# Patient Record
Sex: MALE | Race: WHITE | Employment: OTHER | URBAN - METROPOLITAN AREA
[De-identification: names, ages, dates, MRNs, and addresses within clinical notes are randomized per-mention and may not be internally consistent; named-entity substitution may affect disease eponyms.]

---

## 2018-02-13 LAB
AVERAGE GLUCOSE: NORMAL
HBA1C MFR BLD: 5.9 %

## 2019-03-01 LAB — DIABETIC RETINOPATHY: NEGATIVE

## 2019-07-18 RX ORDER — ATORVASTATIN CALCIUM 40 MG/1
40 TABLET, FILM COATED ORAL DAILY
Qty: 90 TABLET | Refills: 1 | Status: SHIPPED | OUTPATIENT
Start: 2019-07-18 | End: 2019-12-03 | Stop reason: SDUPTHER

## 2019-07-30 LAB
ALBUMIN SERPL-MCNC: NORMAL G/DL
ALP BLD-CCNC: NORMAL U/L
ALT SERPL-CCNC: NORMAL U/L
ANION GAP SERPL CALCULATED.3IONS-SCNC: NORMAL MMOL/L
AST SERPL-CCNC: NORMAL U/L
BASOPHILS ABSOLUTE: NORMAL /ΜL
BASOPHILS RELATIVE PERCENT: NORMAL %
BILIRUB SERPL-MCNC: NORMAL MG/DL (ref 0.1–1.4)
BUN BLDV-MCNC: NORMAL MG/DL
CALCIUM SERPL-MCNC: NORMAL MG/DL
CHLORIDE BLD-SCNC: NORMAL MMOL/L
CHOLESTEROL, TOTAL: 99 MG/DL
CHOLESTEROL/HDL RATIO: ABNORMAL
CO2: NORMAL MMOL/L
CREAT SERPL-MCNC: NORMAL MG/DL
CREATININE, URINE: 80
EOSINOPHILS ABSOLUTE: NORMAL /ΜL
EOSINOPHILS RELATIVE PERCENT: NORMAL %
GFR CALCULATED: NORMAL
GLUCOSE BLD-MCNC: NORMAL MG/DL
HCT VFR BLD CALC: NORMAL % (ref 41–53)
HDLC SERPL-MCNC: 23 MG/DL (ref 35–70)
HEMOGLOBIN: NORMAL G/DL (ref 13.5–17.5)
LDL CHOLESTEROL CALCULATED: 49 MG/DL (ref 0–160)
LYMPHOCYTES ABSOLUTE: NORMAL /ΜL
LYMPHOCYTES RELATIVE PERCENT: NORMAL %
MCH RBC QN AUTO: NORMAL PG
MCHC RBC AUTO-ENTMCNC: NORMAL G/DL
MCV RBC AUTO: NORMAL FL
MICROALBUMIN/CREAT 24H UR: 0.5 MG/G{CREAT}
MICROALBUMIN/CREAT UR-RTO: 6
MONOCYTES ABSOLUTE: NORMAL /ΜL
MONOCYTES RELATIVE PERCENT: NORMAL %
NEUTROPHILS ABSOLUTE: NORMAL /ΜL
NEUTROPHILS RELATIVE PERCENT: NORMAL %
PLATELET # BLD: NORMAL K/ΜL
PMV BLD AUTO: NORMAL FL
POTASSIUM SERPL-SCNC: NORMAL MMOL/L
PROSTATE SPECIFIC ANTIGEN: NORMAL NG/ML
RBC # BLD: NORMAL 10^6/ΜL
SODIUM BLD-SCNC: NORMAL MMOL/L
TOTAL PROTEIN: NORMAL
TRIGL SERPL-MCNC: 194 MG/DL
TSH SERPL DL<=0.05 MIU/L-ACNC: 1.59 UIU/ML
VLDLC SERPL CALC-MCNC: ABNORMAL MG/DL
WBC # BLD: NORMAL 10^3/ML

## 2019-08-26 ENCOUNTER — OFFICE VISIT (OUTPATIENT)
Dept: PRIMARY CARE CLINIC | Age: 69
End: 2019-08-26
Payer: MEDICARE

## 2019-08-26 VITALS
DIASTOLIC BLOOD PRESSURE: 70 MMHG | WEIGHT: 232.6 LBS | OXYGEN SATURATION: 98 % | HEART RATE: 88 BPM | SYSTOLIC BLOOD PRESSURE: 134 MMHG | BODY MASS INDEX: 34.35 KG/M2

## 2019-08-26 DIAGNOSIS — E55.9 VITAMIN D DEFICIENCY, UNSPECIFIED: ICD-10-CM

## 2019-08-26 DIAGNOSIS — E78.2 MIXED HYPERLIPIDEMIA: ICD-10-CM

## 2019-08-26 DIAGNOSIS — D64.9 ANEMIA, UNSPECIFIED TYPE: ICD-10-CM

## 2019-08-26 DIAGNOSIS — H10.9 CONJUNCTIVITIS OF LEFT EYE, UNSPECIFIED CONJUNCTIVITIS TYPE: ICD-10-CM

## 2019-08-26 DIAGNOSIS — D07.5 CARCINOMA IN SITU OF PROSTATE: ICD-10-CM

## 2019-08-26 DIAGNOSIS — E53.9 VITAMIN B DEFICIENCY: ICD-10-CM

## 2019-08-26 DIAGNOSIS — E11.8 TYPE 2 DIABETES MELLITUS WITH COMPLICATION, UNSPECIFIED WHETHER LONG TERM INSULIN USE: Primary | ICD-10-CM

## 2019-08-26 DIAGNOSIS — I10 HYPERTENSION, ESSENTIAL: ICD-10-CM

## 2019-08-26 DIAGNOSIS — J30.1 SEASONAL ALLERGIC RHINITIS DUE TO POLLEN: ICD-10-CM

## 2019-08-26 PROCEDURE — 99214 OFFICE O/P EST MOD 30 MIN: CPT | Performed by: FAMILY MEDICINE

## 2019-08-26 RX ORDER — TOBRAMYCIN 3 MG/ML
SOLUTION/ DROPS OPHTHALMIC
Qty: 5 ML | Refills: 0 | Status: SHIPPED | OUTPATIENT
Start: 2019-08-26 | End: 2019-12-03

## 2019-08-26 NOTE — ASSESSMENT & PLAN NOTE
Recommend nasal saline, nasal steroid with precautions plus/minus Allegra daily as needed or equivalent

## 2019-08-26 NOTE — PROGRESS NOTES
19  Mandi Allen : 1950 Sex: male  Age: 71 y.o. Chief Complaint   Patient presents with    Discuss Labs       HPI  HPI:    Overall feels well. For 2 days he has had redness of the left eye with drainage and matting but no pain or change in vision. History of ophthalmic issues and follows with ophthalmologist but this is different. Precautions reviewed. Encouraged the ophthalmologist to be safe with his history. Chronic watery postnasal drainage. No other points concerns. Follows with Dr. Madonna Hill. Blood work reviewed. Microalbumin negative. Triglyceride 194 HDL 23 LDL 49 glucose 149 CMP normal  TSH 1.59 CBC normal PSA less than 0.1      Review of Systems  ROS:  Const: Denies chills, fever, malaise and sweats. Eyes: Chronic ophthalmic symptoms with chronic visual disturbance, acutely has nonpainful redness drainage and matting. ENMT: Denies earaches, other ear symptoms. Denies nasal or sinus symptoms other than stated  above. Denies mouth and tongue lesions and sore throat. CV: Denies chest discomfort, pain; diaphoresis, dizziness, edema, lightheadedness, orthopnea,  palpitations, syncope and near syncopal episode or any exertional symptoms  Resp: Denies cough, hemoptysis, pleuritic pain, SOB, sputum production and wheezing. GI: Denies abdominal pain, change in bowel habits, hematochezia, melena, nausea and vomiting. : Denies urinary symptoms including dysuria , urgency, frequency or hematuria. Musculo: Denies musculoskeletal symptoms. Skin: Denies bruising and rash.   Neuro: Denies headache, numbness, stiff neck, tingling and focal weakness slurred speech or facial  droop  Hema/Lymph: Denies bleeding/bruising tendency and enlarged lymph nodes        Current Outpatient Medications:     tobramycin (TOBREX) 0.3 % ophthalmic solution, 2 gtts B/L eyes every 3-4 hours while awake day 1, then tid x 7 days total., Disp: 5 mL, Rfl: 0    atorvastatin (LIPITOR) 40 MG tablet, Take 1

## 2019-08-26 NOTE — ASSESSMENT & PLAN NOTE
Continue per clayton. Chasity . PSA <0.01 on 8/19. I recommended he  follow-up with urology still with history of prostate cancer, history of seeds.  No longer  seeing Dr. Rufina Palacio

## 2019-11-19 RX ORDER — METOPROLOL SUCCINATE 25 MG/1
25 TABLET, EXTENDED RELEASE ORAL DAILY
Qty: 30 TABLET | Refills: 0 | Status: SHIPPED | OUTPATIENT
Start: 2019-11-19 | End: 2019-12-03 | Stop reason: SDUPTHER

## 2019-11-23 LAB
ALBUMIN SERPL-MCNC: 4.6 G/DL
ALP BLD-CCNC: 45 U/L
ALT SERPL-CCNC: 27 U/L
ANION GAP SERPL CALCULATED.3IONS-SCNC: NORMAL MMOL/L
AST SERPL-CCNC: 17 U/L
BASOPHILS ABSOLUTE: 49 /ΜL
BASOPHILS RELATIVE PERCENT: 0.8 %
BILIRUB SERPL-MCNC: 0.6 MG/DL (ref 0.1–1.4)
BILIRUBIN, URINE: NEGATIVE
BLOOD, URINE: NEGATIVE
BUN BLDV-MCNC: 22 MG/DL
CALCIUM SERPL-MCNC: 10.5 MG/DL
CHLORIDE BLD-SCNC: 104 MMOL/L
CHOLESTEROL, TOTAL: 119 MG/DL
CHOLESTEROL/HDL RATIO: 5.7
CLARITY: CLEAR
CO2: 29 MMOL/L
COLOR: YELLOW
CREAT SERPL-MCNC: 0.95 MG/DL
CREATININE, URINE: NORMAL
EOSINOPHILS ABSOLUTE: 128 /ΜL
EOSINOPHILS RELATIVE PERCENT: 2.1 %
GFR CALCULATED: NORMAL
GLUCOSE BLD-MCNC: 112 MG/DL
GLUCOSE URINE: NORMAL
HCT VFR BLD CALC: 44 % (ref 41–53)
HDLC SERPL-MCNC: 21 MG/DL (ref 35–70)
HEMOGLOBIN: 14.6 G/DL (ref 13.5–17.5)
KETONES, URINE: NEGATIVE
LDL CHOLESTEROL CALCULATED: 71 MG/DL (ref 0–160)
LEUKOCYTE ESTERASE, URINE: NEGATIVE
LYMPHOCYTES ABSOLUTE: 1983 /ΜL
LYMPHOCYTES RELATIVE PERCENT: 32.5 %
MCH RBC QN AUTO: 29.6 PG
MCHC RBC AUTO-ENTMCNC: 33.2 G/DL
MCV RBC AUTO: 89.2 FL
MICROALBUMIN/CREAT 24H UR: NORMAL MG/G{CREAT}
MICROALBUMIN/CREAT UR-RTO: 6
MONOCYTES ABSOLUTE: 470 /ΜL
MONOCYTES RELATIVE PERCENT: 7.7 %
NEUTROPHILS ABSOLUTE: 3471 /ΜL
NEUTROPHILS RELATIVE PERCENT: 56.9 %
NITRITE, URINE: NEGATIVE
PDW BLD-RTO: 13.4 %
PH UA: NORMAL
PLATELET # BLD: 260 K/ΜL
PMV BLD AUTO: 12.6 FL
POTASSIUM SERPL-SCNC: 4.5 MMOL/L
PROTEIN UA: NEGATIVE
RBC # BLD: 4.93 10^6/ΜL
SODIUM BLD-SCNC: 140 MMOL/L
SPECIFIC GRAVITY, URINE: 1.02
TOTAL CK: 167 U/L
TOTAL PROTEIN: 7.4
TRIGL SERPL-MCNC: 208 MG/DL
TSH SERPL DL<=0.05 MIU/L-ACNC: 2.17 UIU/ML
UROBILINOGEN, URINE: NORMAL
VLDLC SERPL CALC-MCNC: ABNORMAL MG/DL
WBC # BLD: 6.1 10^3/ML

## 2019-12-03 ENCOUNTER — OFFICE VISIT (OUTPATIENT)
Dept: PRIMARY CARE CLINIC | Age: 69
End: 2019-12-03
Payer: MEDICARE

## 2019-12-03 VITALS
OXYGEN SATURATION: 96 % | DIASTOLIC BLOOD PRESSURE: 60 MMHG | BODY MASS INDEX: 34.67 KG/M2 | SYSTOLIC BLOOD PRESSURE: 128 MMHG | WEIGHT: 234.8 LBS | HEART RATE: 83 BPM

## 2019-12-03 DIAGNOSIS — E11.8 TYPE 2 DIABETES MELLITUS WITH COMPLICATION (HCC): ICD-10-CM

## 2019-12-03 DIAGNOSIS — Z00.00 HEALTH MAINTENANCE EXAMINATION: ICD-10-CM

## 2019-12-03 DIAGNOSIS — E55.9 VITAMIN D DEFICIENCY, UNSPECIFIED: ICD-10-CM

## 2019-12-03 DIAGNOSIS — E53.9 VITAMIN B DEFICIENCY: ICD-10-CM

## 2019-12-03 DIAGNOSIS — I10 HYPERTENSION, ESSENTIAL: ICD-10-CM

## 2019-12-03 DIAGNOSIS — E78.2 MIXED HYPERLIPIDEMIA: Primary | ICD-10-CM

## 2019-12-03 DIAGNOSIS — D64.9 ANEMIA, UNSPECIFIED TYPE: ICD-10-CM

## 2019-12-03 DIAGNOSIS — D07.5 CARCINOMA IN SITU OF PROSTATE: ICD-10-CM

## 2019-12-03 DIAGNOSIS — E83.52 HYPERCALCEMIA: ICD-10-CM

## 2019-12-03 PROCEDURE — 99214 OFFICE O/P EST MOD 30 MIN: CPT | Performed by: FAMILY MEDICINE

## 2019-12-03 PROCEDURE — G0008 ADMIN INFLUENZA VIRUS VAC: HCPCS | Performed by: FAMILY MEDICINE

## 2019-12-03 PROCEDURE — 90653 IIV ADJUVANT VACCINE IM: CPT | Performed by: FAMILY MEDICINE

## 2019-12-03 RX ORDER — ATORVASTATIN CALCIUM 40 MG/1
40 TABLET, FILM COATED ORAL DAILY
Qty: 90 TABLET | Refills: 3 | Status: SHIPPED
Start: 2019-12-03 | End: 2020-10-19 | Stop reason: SDUPTHER

## 2019-12-03 RX ORDER — QUINAPRIL 20 MG/1
20 TABLET ORAL DAILY
Qty: 90 TABLET | Refills: 3 | Status: SHIPPED
Start: 2019-12-03 | End: 2020-10-19 | Stop reason: SDUPTHER

## 2019-12-03 RX ORDER — METOPROLOL SUCCINATE 25 MG/1
25 TABLET, EXTENDED RELEASE ORAL DAILY
Qty: 90 TABLET | Refills: 3 | Status: SHIPPED
Start: 2019-12-03 | End: 2020-10-19 | Stop reason: SDUPTHER

## 2019-12-18 DIAGNOSIS — D64.9 ANEMIA, UNSPECIFIED TYPE: ICD-10-CM

## 2019-12-18 DIAGNOSIS — E53.9 VITAMIN B DEFICIENCY: ICD-10-CM

## 2019-12-18 DIAGNOSIS — D07.5 CARCINOMA IN SITU OF PROSTATE: ICD-10-CM

## 2019-12-18 DIAGNOSIS — E55.9 VITAMIN D DEFICIENCY, UNSPECIFIED: ICD-10-CM

## 2019-12-18 DIAGNOSIS — H10.9 CONJUNCTIVITIS OF LEFT EYE, UNSPECIFIED CONJUNCTIVITIS TYPE: ICD-10-CM

## 2019-12-18 DIAGNOSIS — I10 HYPERTENSION, ESSENTIAL: ICD-10-CM

## 2019-12-18 DIAGNOSIS — E11.8 TYPE 2 DIABETES MELLITUS WITH COMPLICATION (HCC): ICD-10-CM

## 2019-12-18 DIAGNOSIS — E78.2 MIXED HYPERLIPIDEMIA: ICD-10-CM

## 2020-01-02 PROBLEM — Z00.00 HEALTH MAINTENANCE EXAMINATION: Status: RESOLVED | Noted: 2019-12-03 | Resolved: 2020-01-02

## 2020-03-05 ENCOUNTER — OFFICE VISIT (OUTPATIENT)
Dept: FAMILY MEDICINE CLINIC | Age: 70
End: 2020-03-05
Payer: MEDICARE

## 2020-03-05 VITALS
TEMPERATURE: 98 F | SYSTOLIC BLOOD PRESSURE: 126 MMHG | HEART RATE: 112 BPM | OXYGEN SATURATION: 98 % | DIASTOLIC BLOOD PRESSURE: 68 MMHG | BODY MASS INDEX: 34.2 KG/M2 | WEIGHT: 231.6 LBS

## 2020-03-05 PROBLEM — C61 PRIMARY MALIGNANT NEOPLASM OF PROSTATE (HCC): Status: ACTIVE | Noted: 2019-06-11

## 2020-03-05 PROBLEM — E11.49 DIABETIC NEUROPATHY WITH NEUROLOGIC COMPLICATION (HCC): Status: ACTIVE | Noted: 2019-06-11

## 2020-03-05 PROBLEM — E11.40 DIABETIC NEUROPATHY WITH NEUROLOGIC COMPLICATION (HCC): Status: ACTIVE | Noted: 2019-06-11

## 2020-03-05 PROBLEM — E66.9 OBESITY: Status: ACTIVE | Noted: 2019-06-11

## 2020-03-05 PROBLEM — Z79.4 LONG TERM CURRENT USE OF INSULIN (HCC): Status: ACTIVE | Noted: 2019-06-11

## 2020-03-05 PROCEDURE — 99213 OFFICE O/P EST LOW 20 MIN: CPT | Performed by: FAMILY MEDICINE

## 2020-03-05 RX ORDER — AMOXICILLIN 875 MG/1
875 TABLET, COATED ORAL 2 TIMES DAILY
Qty: 14 TABLET | Refills: 0 | Status: SHIPPED | OUTPATIENT
Start: 2020-03-05 | End: 2020-03-12

## 2020-03-05 RX ORDER — LIDOCAINE HYDROCHLORIDE 20 MG/ML
10 SOLUTION OROPHARYNGEAL
Qty: 100 ML | Refills: 1 | Status: SHIPPED
Start: 2020-03-05 | End: 2020-06-01 | Stop reason: ALTCHOICE

## 2020-03-05 RX ORDER — METHYLPREDNISOLONE 4 MG/1
TABLET ORAL
Qty: 1 KIT | Refills: 0 | Status: SHIPPED
Start: 2020-03-05 | End: 2020-06-01 | Stop reason: ALTCHOICE

## 2020-03-05 ASSESSMENT — ENCOUNTER SYMPTOMS
TROUBLE SWALLOWING: 1
SWOLLEN GLANDS: 1
SORE THROAT: 1
EYES NEGATIVE: 1
RESPIRATORY NEGATIVE: 1
GASTROINTESTINAL NEGATIVE: 1

## 2020-03-05 NOTE — PROGRESS NOTES
3/5/2020     Isidoro Means (:  1950) is a 71 y.o. male, here for evaluation of the following medical concerns:    Pharyngitis   This is a new problem. The current episode started yesterday. The problem has been rapidly worsening. Associated symptoms include a fever, a sore throat and swollen glands. Pertinent negatives include no headaches, neck pain or rash. The symptoms are aggravated by drinking, eating and swallowing. Review of Systems   Constitutional: Positive for fever. HENT: Positive for postnasal drip, sore throat and trouble swallowing. Eyes: Negative. Respiratory: Negative. Cardiovascular: Negative. Gastrointestinal: Negative. Musculoskeletal: Negative for neck pain. Skin: Negative for rash. Neurological: Negative for headaches. Hematological: Negative for adenopathy. Prior to Visit Medications    Medication Sig Taking? Authorizing Provider   atorvastatin (LIPITOR) 40 MG tablet Take 1 tablet by mouth daily  Hina Lundy MD   metFORMIN (GLUCOPHAGE) 1000 MG tablet Take 1 tablet by mouth 2 times daily (with meals)  Hina Lundy MD   metoprolol succinate (TOPROL XL) 25 MG extended release tablet Take 1 tablet by mouth daily  Hina Lundy MD   quinapril (ACCUPRIL) 20 MG tablet Take 1 tablet by mouth daily  Hina Lundy MD   fenofibrate (TRICOR) 54 MG tablet   Take 54 mg by mouth daily   Historical Provider, MD   HUMALOG MIX 75/25 KWIKPEN (75-25) 100 UNIT/ML SUPN injection pen   90 Units 2 times daily (with meals)   Historical Provider, MD   aspirin 81 MG tablet   Take 81 mg by mouth daily Prescribed per Dr Marilee Daniel. Contact Dr Ofelia Ledesma preop instructions.   Historical Provider, MD   vitamin B-12 (CYANOCOBALAMIN) 100 MCG tablet Take 1,000 mcg by mouth daily  Historical Provider, MD   vitamin D (CHOLECALCIFEROL) 1000 UNITS TABS tablet Take 2,000 Units by mouth daily  Historical Provider, MD        Social History     Tobacco Use    Smoking status: tablet    lidocaine viscous hcl (XYLOCAINE) 2 % SOLN solution         Counseled regarding above diagnosis, including possible risks and complications, especially if left untreated. Counseled regarding the possible side effects, risks, benefits and alternatives to treatment; patient and/or guardian verbalizes understanding, agrees, and wishes to proceed with above treatment plan. Call or go to ED immediately if symptoms worsen or persist. Advised patient to call with any new medication issues. .     As applicable, educational materials and/or home exercises printed for patient's review and were included in patient instructions on his/her After Visit Summary and given to patient at the end of visit. Patient and/or guardian given opportunity to ask questions/raise concerns. The patient verbalized comfort and understanding ofinstructions. Nancy Cartagena D.O.   9:57 AM  3/5/2020       This document may have been prepared at least partially through the use of voice recognition software. Although effort is taken to assure the accuracy of this document, it is possible that grammatical, syntax,  or spelling errors may occur.

## 2020-05-28 ENCOUNTER — TELEPHONE (OUTPATIENT)
Dept: PRIMARY CARE CLINIC | Age: 70
End: 2020-05-28

## 2020-05-29 LAB
ALBUMIN SERPL-MCNC: 4.5 G/DL
ALP BLD-CCNC: 42 U/L
ALT SERPL-CCNC: 33 U/L
ANION GAP SERPL CALCULATED.3IONS-SCNC: NORMAL MMOL/L
AST SERPL-CCNC: 23 U/L
BASOPHILS ABSOLUTE: 61 /ΜL
BASOPHILS RELATIVE PERCENT: 0.9 %
BILIRUB SERPL-MCNC: 0.6 MG/DL (ref 0.1–1.4)
BILIRUBIN, URINE: NEGATIVE
BLOOD, URINE: NEGATIVE
BUN BLDV-MCNC: 19 MG/DL
CALCIUM SERPL-MCNC: 10 MG/DL
CHLORIDE BLD-SCNC: 107 MMOL/L
CHOLESTEROL, TOTAL: 106 MG/DL
CHOLESTEROL/HDL RATIO: 4.6
CLARITY: CLEAR
CO2: 29 MMOL/L
COLOR: YELLOW
CREAT SERPL-MCNC: 0.88 MG/DL
CREATININE, URINE: 137
EOSINOPHILS ABSOLUTE: 136 /ΜL
EOSINOPHILS RELATIVE PERCENT: 2 %
FOLATE: 15
GFR CALCULATED: NORMAL
GLUCOSE BLD-MCNC: 103 MG/DL
GLUCOSE URINE: NEGATIVE
HCT VFR BLD CALC: 42.2 % (ref 41–53)
HDLC SERPL-MCNC: 23 MG/DL (ref 35–70)
HEMOGLOBIN: 14.2 G/DL (ref 13.5–17.5)
KETONES, URINE: NEGATIVE
LDL CHOLESTEROL CALCULATED: 59 MG/DL (ref 0–160)
LEUKOCYTE ESTERASE, URINE: NEGATIVE
LYMPHOCYTES ABSOLUTE: 2088 /ΜL
LYMPHOCYTES RELATIVE PERCENT: 30.7 %
MCH RBC QN AUTO: 29.6 PG
MCHC RBC AUTO-ENTMCNC: 33.6 G/DL
MCV RBC AUTO: 88.1 FL
MICROALBUMIN/CREAT 24H UR: 0.9 MG/G{CREAT}
MICROALBUMIN/CREAT UR-RTO: 7
MONOCYTES ABSOLUTE: 510 /ΜL
MONOCYTES RELATIVE PERCENT: 7.5 %
NEUTROPHILS ABSOLUTE: 4005 /ΜL
NEUTROPHILS RELATIVE PERCENT: 58.9 %
NITRITE, URINE: NEGATIVE
PDW BLD-RTO: 13.1 %
PH UA: NORMAL
PLATELET # BLD: 231 K/ΜL
PMV BLD AUTO: 12.7 FL
POTASSIUM SERPL-SCNC: 4.7 MMOL/L
PROSTATE SPECIFIC ANTIGEN: 0.1 NG/ML
PROTEIN UA: NEGATIVE
PTH INTACT: 19
RBC # BLD: 4.79 10^6/ΜL
SODIUM BLD-SCNC: 144 MMOL/L
SPECIFIC GRAVITY, URINE: 1.03
TOTAL CK: 253 U/L
TOTAL PROTEIN: 7.1
TRIGL SERPL-MCNC: 165 MG/DL
TSH SERPL DL<=0.05 MIU/L-ACNC: 2.03 UIU/ML
UROBILINOGEN, URINE: NORMAL
VITAMIN B-12: 445
VITAMIN D 25-HYDROXY: 34
VITAMIN D2, 25 HYDROXY: NORMAL
VITAMIN D3,25 HYDROXY: NORMAL
VLDLC SERPL CALC-MCNC: ABNORMAL MG/DL
WBC # BLD: 6.8 10^3/ML

## 2020-06-01 ENCOUNTER — OFFICE VISIT (OUTPATIENT)
Dept: PRIMARY CARE CLINIC | Age: 70
End: 2020-06-01
Payer: MEDICARE

## 2020-06-01 VITALS
BODY MASS INDEX: 34.85 KG/M2 | TEMPERATURE: 98 F | HEART RATE: 60 BPM | OXYGEN SATURATION: 100 % | DIASTOLIC BLOOD PRESSURE: 68 MMHG | RESPIRATION RATE: 16 BRPM | SYSTOLIC BLOOD PRESSURE: 124 MMHG | WEIGHT: 236 LBS

## 2020-06-01 PROCEDURE — 99214 OFFICE O/P EST MOD 30 MIN: CPT | Performed by: FAMILY MEDICINE

## 2020-06-01 ASSESSMENT — PATIENT HEALTH QUESTIONNAIRE - PHQ9
1. LITTLE INTEREST OR PLEASURE IN DOING THINGS: 0
SUM OF ALL RESPONSES TO PHQ9 QUESTIONS 1 & 2: 0
SUM OF ALL RESPONSES TO PHQ QUESTIONS 1-9: 0
2. FEELING DOWN, DEPRESSED OR HOPELESS: 0
SUM OF ALL RESPONSES TO PHQ QUESTIONS 1-9: 0

## 2020-06-01 NOTE — PROGRESS NOTES
treatment other than as noted. Notify me if changes  mind. Signs and symptoms to watch for discussed. Serious signs and symptoms  reviewed. ER if any. Has been somewhat labile. He is asymptomatic. He will follow  with Dr. Eriberto Foley. Potential seriousness reviewed. declines malignancy screening  or repeat bone density  History of vitamin D deficiency on supplementation as well. Monitor levels. monitor currently normal     Type 2 diabetes mellitus with complication (HCC)  following with Dr. Eriberto Foley. Watch BS closely ambulatory. Parameters given. macro  and microvascular complications reviewed. Call if not in range. ER if dangerous  numbers. hyper and hypoglycemic precautions reviewed. con't per Dr Eriberto Foley. Discussed regular eye exams, daily foot examinations. excellent control . on high  dose insulin. risk of hypoglycemia and tx reviewed. On quinapril, risks benefits  reviewed  Wants to  continue current therapy.  Last A1c less than 7. He defers to Dr. Eriberto Foley     Mixed hyperlipidemia  Overall doing well. Continue current therapy. lifestyle modification reviewed. risk of  hyperlipidemia reviewed tolerating therapy. Niaspan and fenofibrate was discontinued, Niaspan because of cost. Could consider  fenofibrate if triglycerides mandate , mildly elevated, HDL chronically low. goals reviewed. declines  change in tx.   Wants to continue current tx.  Proper hydration reviewed     Hypertension, essential  Watch ambulatory, parameters given. If out of range, notify. If dangerous numbers,  directly to ER. Risk of HTN reviewed. lifestyle modification emphasized. Risks of  hypertension and hypotension reviewed. Tolerating therapy. Tolerating Toprol. And quinapril.     Carcinoma in situ of prostate  Continue per clayton. Chasity . PSA <0.01 on 6/20.  I recommended he  follow-up with urology still with history of prostate cancer, history of seeds. No longer  seeing Dr. Jose Ahmadi . Would like us to monitor PSA.  Defers

## 2020-06-22 ENCOUNTER — OFFICE VISIT (OUTPATIENT)
Dept: FAMILY MEDICINE CLINIC | Age: 70
End: 2020-06-22
Payer: MEDICARE

## 2020-06-22 VITALS
HEIGHT: 69 IN | HEART RATE: 63 BPM | WEIGHT: 228.2 LBS | SYSTOLIC BLOOD PRESSURE: 144 MMHG | DIASTOLIC BLOOD PRESSURE: 82 MMHG | TEMPERATURE: 96.6 F | BODY MASS INDEX: 33.8 KG/M2

## 2020-06-22 PROCEDURE — 99213 OFFICE O/P EST LOW 20 MIN: CPT | Performed by: FAMILY MEDICINE

## 2020-06-22 RX ORDER — IBUPROFEN 600 MG/1
600 TABLET ORAL EVERY 6 HOURS PRN
Qty: 60 TABLET | Refills: 1 | Status: SHIPPED
Start: 2020-06-22 | End: 2020-06-29

## 2020-06-22 RX ORDER — METHYLPREDNISOLONE 4 MG/1
TABLET ORAL
Qty: 1 KIT | Refills: 0 | Status: SHIPPED
Start: 2020-06-22 | End: 2020-06-29

## 2020-06-22 ASSESSMENT — ENCOUNTER SYMPTOMS: RESPIRATORY NEGATIVE: 1

## 2020-06-23 ENCOUNTER — TELEPHONE (OUTPATIENT)
Dept: PRIMARY CARE CLINIC | Age: 70
End: 2020-06-23

## 2020-06-23 ENCOUNTER — VIRTUAL VISIT (OUTPATIENT)
Dept: PRIMARY CARE CLINIC | Age: 70
End: 2020-06-23
Payer: MEDICARE

## 2020-06-23 PROBLEM — M51.369 DEGENERATIVE DISC DISEASE, LUMBAR: Status: ACTIVE | Noted: 2020-06-23

## 2020-06-23 PROBLEM — M51.36 DEGENERATIVE DISC DISEASE, LUMBAR: Status: ACTIVE | Noted: 2020-06-23

## 2020-06-23 PROBLEM — M25.552 LEFT HIP PAIN: Status: ACTIVE | Noted: 2020-06-23

## 2020-06-23 PROCEDURE — 99443 PR PHYS/QHP TELEPHONE EVALUATION 21-30 MIN: CPT | Performed by: FAMILY MEDICINE

## 2020-06-23 RX ORDER — PREDNISONE 10 MG/1
TABLET ORAL
Qty: 12 TABLET | Refills: 0 | Status: SHIPPED
Start: 2020-06-23 | End: 2020-06-29 | Stop reason: ALTCHOICE

## 2020-06-23 NOTE — TELEPHONE ENCOUNTER
Patient called and stated that he was seen in the office yesterday through express care for hip pain. He was given a steroid and ibuprofen. Patient stated the current medication is not helping. He would like to have a telephone call with Dr. Gal Ortiz to discuss. Scheduled.

## 2020-06-23 NOTE — ASSESSMENT & PLAN NOTE
Counseled extensively. Differential reviewed, including serious etiologies. His description of the telephone sounds more in the trochanteric bursa area. Possibly referred from back as well. Other differential reviewed including hip etiology, occult fracture labral tear etc. but sounds in the wrong area.

## 2020-06-23 NOTE — PROGRESS NOTES
Social History     Social History Narrative    PMH:    Past Medical History: diabetes mellitus type II insulin requiring, hyperlipidemia, hypertension, Peyronie's    disease, prostate cancer, hypercalcemia, vitreous hemmorage B/L            Family Medical History: Dad  of diabetes complications at age 67. He had two myocardial infarction,    first at age 58 and 4-5 cerebrovascular accidents afterwards. Mom did not have coronary artery disease    that he is aware of but did have diabetes and  at the age of 80 of diabetic complications. Sister     at age 62 of diabetic complications, \"did not take care of herself\", had significant obesity, renal failure and    was on dialysis. He states mom had peripheral vascular disease and gangrene which lead to renal    failure and a cascade of events. Surgical Hx:    Rotator Cuff - RT Early     B/L Cataract    Vitreous Hemmorage B/L - SCAR TISSUE LEFT - BLIND    RIGHT EYE DOING OK. MULTIPLE SURGERIES ON BOTH    Left Shoulder - Rotator Cuff Dr Alfie Mclaughlin     SH: Marital: , With 2 Children, Legal Status: . Personal Habits: Cigarette Use: Former Cigarette Smoker - Quit . Alcohol: Occasionally    consumes alcohol. Exercise Type: Walks 4 times a week, Employed Through [employer] -    Retired. .    Reviewed and updated. ASSESSMENT AND PLAN:     Diagnosis Orders   1. Left hip pain  External Referral To Orthopedic Surgery    predniSONE (DELTASONE) 10 MG tablet    diclofenac sodium (VOLTAREN) 1 % GEL   2. Degenerative disc disease, lumbar  XR LUMBAR SPINE (2-3 VIEWS)   3. Type 2 diabetes mellitus with complication (HCC)         Left hip pain  Counseled extensively. Differential reviewed, including serious etiologies. His description of the telephone sounds more in the trochanteric bursa area. Possibly referred from back as well.   Other differential reviewed including hip etiology, occult fracture labral tear etc. but Signs and symptoms to watch for were discussed. Serious signs and symptoms reviewed.  ER if any          Note: not billable if this call serves to triage the patient into an appointment for the relevant concern      Pepito Muller MD

## 2020-06-29 ENCOUNTER — OFFICE VISIT (OUTPATIENT)
Dept: PRIMARY CARE CLINIC | Age: 70
End: 2020-06-29
Payer: MEDICARE

## 2020-06-29 VITALS
RESPIRATION RATE: 16 BRPM | WEIGHT: 241 LBS | TEMPERATURE: 97.3 F | OXYGEN SATURATION: 100 % | HEART RATE: 87 BPM | DIASTOLIC BLOOD PRESSURE: 62 MMHG | BODY MASS INDEX: 35.59 KG/M2 | SYSTOLIC BLOOD PRESSURE: 128 MMHG

## 2020-06-29 PROCEDURE — 99213 OFFICE O/P EST LOW 20 MIN: CPT | Performed by: FAMILY MEDICINE

## 2020-06-29 RX ORDER — HYDROCODONE BITARTRATE AND ACETAMINOPHEN 5; 325 MG/1; MG/1
1 TABLET ORAL 2 TIMES DAILY PRN
Qty: 10 TABLET | Refills: 0 | Status: SHIPPED | OUTPATIENT
Start: 2020-06-29 | End: 2020-07-04

## 2020-06-29 NOTE — PROGRESS NOTES
daily Prescribed per Dr Kirsty Crowley. Contact Dr Alireza Crump preop instructions. , Disp: , Rfl:     vitamin B-12 (CYANOCOBALAMIN) 100 MCG tablet, Take 1,000 mcg by mouth daily, Disp: , Rfl:     vitamin D (CHOLECALCIFEROL) 1000 UNITS TABS tablet, Take 2,000 Units by mouth daily, Disp: , Rfl:   No Known Allergies    Past Medical History:   Diagnosis Date    Anemia     Bleeding of eye     behind eye going to have surgery 2015    Hyperlipidemia     Hypertension     Myalgia     [facility]     Prostate enlargement     follows Dr Mateo Quinones heart beat 2015    follows with Dr Meri Rinne Type 2 diabetes mellitus without complication [facility]     Type II or unspecified type diabetes mellitus without mention of complication, not stated as uncontrolled     Vitamin D deficiency     Vitreous hemorrhage, right eye ([facility]      Past Surgical History:   Procedure Laterality Date    CLEFT PALATE REPAIR      EYE SURGERY Bilateral     cataract surgery     KNEE SURGERY Right     OTHER SURGICAL HISTORY Right 2015    pars planta virectomy with par pan retinal photocoagulation    ROTATOR CUFF REPAIR Right      Family History   Problem Relation Age of Onset    Diabetes Mother     Heart Disease Mother     Diabetes Father     Heart Disease Father      Social History     Tobacco Use    Smoking status: Former Smoker     Packs/day: 2.00     Years: 13.00     Pack years: 26.00     Types: Cigarettes     Last attempt to quit: 1984     Years since quittin.5    Smokeless tobacco: Never Used   Substance Use Topics    Alcohol use: No    Drug use: No      Social History     Social History Narrative    PMH:    Past Medical History: diabetes mellitus type II insulin requiring, hyperlipidemia, hypertension, Peyronie's    disease, prostate cancer, hypercalcemia, vitreous hemmorage B/L            Family Medical History: Dad  of diabetes complications at age 67.  He had two myocardial infarction,    first to keep oct appt, sooner prn. As long as symptoms steadily improve/resolve, and medical conditions follow the expected course, FU as below, sooner PRN. Return keep oct, sooner prn. .        Signs and symptoms to watch for discussed, serious signs and symptoms reviewed. ER if any. Annabel Michele MD    Patients are advised to check with insurance company to ensure coverage and to fully understand benefits and cost prior to any testing. This note was created with the assistance of voice recognition software. Document was reviewed however may contain grammatical errors.

## 2020-08-28 ENCOUNTER — TELEPHONE (OUTPATIENT)
Dept: PRIMARY CARE CLINIC | Age: 70
End: 2020-08-28

## 2020-09-04 ENCOUNTER — OFFICE VISIT (OUTPATIENT)
Dept: PRIMARY CARE CLINIC | Age: 70
End: 2020-09-04
Payer: MEDICARE

## 2020-09-04 VITALS
DIASTOLIC BLOOD PRESSURE: 62 MMHG | OXYGEN SATURATION: 94 % | BODY MASS INDEX: 34.36 KG/M2 | WEIGHT: 232 LBS | TEMPERATURE: 93.8 F | HEIGHT: 69 IN | HEART RATE: 96 BPM | SYSTOLIC BLOOD PRESSURE: 128 MMHG

## 2020-09-04 PROCEDURE — 90732 PPSV23 VACC 2 YRS+ SUBQ/IM: CPT | Performed by: FAMILY MEDICINE

## 2020-09-04 PROCEDURE — G0009 ADMIN PNEUMOCOCCAL VACCINE: HCPCS | Performed by: FAMILY MEDICINE

## 2020-09-04 PROCEDURE — G0439 PPPS, SUBSEQ VISIT: HCPCS | Performed by: FAMILY MEDICINE

## 2020-09-04 ASSESSMENT — LIFESTYLE VARIABLES: HOW OFTEN DO YOU HAVE A DRINK CONTAINING ALCOHOL: 0

## 2020-09-04 ASSESSMENT — PATIENT HEALTH QUESTIONNAIRE - PHQ9
SUM OF ALL RESPONSES TO PHQ QUESTIONS 1-9: 0
1. LITTLE INTEREST OR PLEASURE IN DOING THINGS: 0
SUM OF ALL RESPONSES TO PHQ QUESTIONS 1-9: 0
2. FEELING DOWN, DEPRESSED OR HOPELESS: 0
SUM OF ALL RESPONSES TO PHQ9 QUESTIONS 1 & 2: 0

## 2020-09-04 NOTE — PROGRESS NOTES
indications for imaging. Pt declines further imaging including Brain, carotid,  chest, Abdominal, Aortic , PVS or otherwise. AAA screen neg 12/19    FULL CODE: WOULD NOT WANT LEFT ON MACHINE IF DEEMED IRREVERSIBLE    No Known Allergies      Prior to Visit Medications    Medication Sig Taking? Authorizing Provider   diclofenac sodium (VOLTAREN) 1 % GEL 4g qid prn lower ext joints (knees,ankles,feet) max 16g/d. (spine,hip,shoulder use has not been evaluated) Yes Georgie Martinez MD   atorvastatin (LIPITOR) 40 MG tablet Take 1 tablet by mouth daily Yes Georgie Martinez MD   metFORMIN (GLUCOPHAGE) 1000 MG tablet Take 1 tablet by mouth 2 times daily (with meals) Yes Georgie Martinez MD   metoprolol succinate (TOPROL XL) 25 MG extended release tablet Take 1 tablet by mouth daily Yes Georgie Martinez MD   quinapril (ACCUPRIL) 20 MG tablet Take 1 tablet by mouth daily Yes Georgie Martinez MD   fenofibrate (TRICOR) 54 MG tablet   Take 54 mg by mouth daily  Yes Historical Provider, MD   HUMALOG MIX 75/25 KWIKPEN (75-25) 100 UNIT/ML SUPN injection pen 106 Units 2 times daily (with meals)  Yes Historical Provider, MD   aspirin 81 MG tablet   Take 81 mg by mouth daily Prescribed per Dr Clemencia Howard. Contact Dr Vahid Alvarez preop instructions.  Yes Historical Provider, MD   vitamin B-12 (CYANOCOBALAMIN) 100 MCG tablet Take 1,000 mcg by mouth daily Yes Historical Provider, MD   vitamin D (CHOLECALCIFEROL) 1000 UNITS TABS tablet Take 2,000 Units by mouth daily Yes Historical Provider, MD         Past Medical History:   Diagnosis Date    Anemia     Bleeding of eye     behind eye going to have surgery 7/2015    Hyperlipidemia     Hypertension     Myalgia     [facility]     Prostate enlargement     follows Dr Alejo Toledo    Racing heart beat 03/2015    follows with Dr Loki Tripp Type 2 diabetes mellitus without complication [facility]     Type II or unspecified type diabetes mellitus without mention of complication, not stated as or  tenderness  Abdomen: Bowel sounds are normoactive. Abdomen is soft, nontender, and nondistended. No  abdominal masses. No palpable hepatosplenomegaly. Lymph: No palpable or visible regional lymphadenopathy. Musculo: Walks with a normal gait. Upper Extremities: Normal to inspection and palpation. Lower  Extremities: Normal to inspection and palpation. Neuro: Alert and oriented. Affect: appropriate. Upper Extremities: 5/5 bilaterally. Lower Extremities:  5/5 bilaterally. Sensation intact to light touch. Reflexes: DTR's are symmetric and 2+ bilaterally. .  (Tandem Walk) . (Pronator Drift) . (Romberg's Test)  Cranial Nerves: Cranial nerves grossly intact.  skin: clear. Declines Rectal/Prostate today          Patient's complete Health Risk Assessment and screening values have been reviewed and are found in Flowsheets. The following problems were reviewed today and where indicated follow up appointments were made and/or referrals ordered. Positive Risk Factor Screenings with Interventions:     Health Habits/Nutrition:  Health Habits/Nutrition  Do you exercise for at least 20 minutes 2-3 times per week?: (!) No  Have you lost any weight without trying in the past 3 months?: No  Do you eat fewer than 2 meals per day?: No  Have you seen a dentist within the past year?: (!) No  Body mass index is 34.26 kg/m².   Health Habits/Nutrition Interventions:  · Inadequate physical activity:  patient agrees to join a structured exercise program- with caution, patient agrees to increase physical activity as follows: will see dentist    Hearing/Vision:  No exam data present  Hearing/Vision  Do you or your family notice any trouble with your hearing?: (!) Yes  Do you have difficulty driving, watching TV, or doing any of your daily activities because of your eyesight?: No  Have you had an eye exam within the past year?: Yes  Hearing/Vision Interventions:  · Hearing concerns:  patient declines any further evaluation/treatment for screening schedule for the next 5-10 years is provided to the patient in written form: see Patient Umu Hoffman was seen today for medicare awv. Diagnoses and all orders for this visit:    Type 2 diabetes mellitus with complication (HCC)  -      DIABETES FOOT EXAM  -     Hemoglobin A1C; Future  -     Pneumococcal polysaccharide vaccine 23-valent greater than or equal to 3yo subcutaneous/IM    Other problems related to lifestyle  -     Hepatitis C Antibody; Future    Routine general medical examination at a health care facility  -     Full code  -     Pneumococcal polysaccharide vaccine 23-valent greater than or equal to 3yo subcutaneous/IM    Colon cancer screening  -     POCT Fecal Immunochemical Test (FIT); Future            Health maintenance issues discussed at length as above. Encouraged yearly physicals. Plan as above  He will get bw and keep fu next month to review, sooner prn. Annual physicals. FIT. Pneumovax. Cont per multiple specialists. Gomez Kim is a 79 y.o. male being evaluated by a Virtual Visit (video and audio) encounter to address concerns as mentioned above. A caregiver was present when appropriate. Due to this being a TeleHealth encounter (During UJJKJ-69 public health emergency), evaluation of the following organ systems was limited: Vitals/Constitutional/EENT/Resp/CV/GI//MS/Neuro/Skin/Heme-Lymph-Imm. Pursuant to the emergency declaration under the [address] [place] [address] authority and the [employer] Act, this Virtual Visit was conducted with patient's (and/or legal guardian's) consent, to reduce the patient's risk of exposure to COVID-19 and provide necessary medical care. The patient (and/or legal guardian) has also been advised to contact this office for worsening conditions or problems, and seek emergency medical treatment and/or call 911 if deemed necessary. Patient identification was verified at the start of the visit: Yes    Services were provided through a video synchronous discussion virtually to substitute for in-person clinic visit. Patient and provider were located at their individual homes. --Mathew Mueller MD on 9/4/2020 at 11:40 AM    An electronic signature was used to authenticate this note.

## 2020-09-10 LAB
CONTROL: NORMAL
HEMOCCULT STL QL: NEGATIVE

## 2020-09-10 PROCEDURE — 82274 ASSAY TEST FOR BLOOD FECAL: CPT | Performed by: FAMILY MEDICINE

## 2020-09-27 ENCOUNTER — HOSPITAL ENCOUNTER (EMERGENCY)
Age: 70
Discharge: HOME | End: 2020-09-27
Attending: FAMILY MEDICINE
Payer: MEDICARE

## 2020-09-27 VITALS
HEART RATE: 82 BPM | WEIGHT: 225 LBS | HEIGHT: 70 IN | TEMPERATURE: 97.3 F | RESPIRATION RATE: 16 BRPM | SYSTOLIC BLOOD PRESSURE: 148 MMHG | DIASTOLIC BLOOD PRESSURE: 82 MMHG | OXYGEN SATURATION: 97 % | BODY MASS INDEX: 32.21 KG/M2

## 2020-09-27 PROCEDURE — 6360000002 HC RX W HCPCS: Performed by: FAMILY MEDICINE

## 2020-09-27 PROCEDURE — 99283 EMERGENCY DEPT VISIT LOW MDM: CPT

## 2020-09-27 PROCEDURE — 99282 EMERGENCY DEPT VISIT SF MDM: CPT

## 2020-09-27 PROCEDURE — 96372 THER/PROPH/DIAG INJ SC/IM: CPT

## 2020-09-27 RX ORDER — HYDROXYZINE PAMOATE 25 MG/1
25 CAPSULE ORAL 3 TIMES DAILY PRN
Qty: 21 CAPSULE | Refills: 0 | Status: SHIPPED | OUTPATIENT
Start: 2020-09-27 | End: 2020-10-04

## 2020-09-27 RX ORDER — PREDNISONE 10 MG/1
TABLET ORAL
Qty: 10 TABLET | Refills: 0 | Status: SHIPPED | OUTPATIENT
Start: 2020-09-27 | End: 2020-10-01

## 2020-09-27 RX ORDER — DEXAMETHASONE SODIUM PHOSPHATE 4 MG/ML
4 INJECTION, SOLUTION INTRA-ARTICULAR; INTRALESIONAL; INTRAMUSCULAR; INTRAVENOUS; SOFT TISSUE ONCE
Status: COMPLETED | OUTPATIENT
Start: 2020-09-27 | End: 2020-09-27

## 2020-09-27 RX ADMIN — DEXAMETHASONE SODIUM PHOSPHATE 4 MG: 4 INJECTION, SOLUTION INTRAMUSCULAR; INTRAVENOUS at 10:29

## 2020-09-27 NOTE — ED PROVIDER NOTES
Department of Emergency Medicine   ED  Provider Note  Admit Date/RoomTime: 9/27/2020  9:42 AM  ED Room: 13/13  Chief Complaint   Rash (poison ivy x 5 days. )    History of Present Illness   Source of history provided by:  patient. History/Exam Limitations: none. Elle Cabrera is a 79 y.o. old male with has a past medical history of:   Past Medical History:   Diagnosis Date    Anemia     Bleeding of eye     behind eye going to have surgery 7/2015    Hyperlipidemia     Hypertension     Myalgia     [facility]     Prostate enlargement     follows Dr Jenn Pham heart beat 03/2015    follows with Dr Ignacio Arellano Type 2 diabetes mellitus without complication ([facility]     Type II or unspecified type diabetes mellitus without mention of complication, not stated as uncontrolled     Vitamin D deficiency     Vitreous hemorrhage, right eye ([facility]     presents to the emergency department by private vehicle and ambulatory, for complaint of gradual onset, worse since past day red, raised, itchy, blistering and weeping area on  Arms and trunk/abdomen which began 5 day(s) prior to arrival.  The symptoms were caused by working on yard with trees. Since onset the symptoms have been worsening. Prior history of similar episodes: Yes. His symptoms are associated with rash and itching and relieved by nothing. He denies any difficulty breathing, difficulty swallowing, wheezing, throat tightness, hoarseness or stridor. ROS    Pertinent positives and negatives are stated within HPI, all other systems reviewed and are negative. Past Surgical History:   Procedure Laterality Date    CLEFT PALATE REPAIR      EYE SURGERY Bilateral     cataract surgery     KNEE SURGERY Right     OTHER SURGICAL HISTORY Right 7/08/2015    pars planta virectomy with par pan retinal photocoagulation    ROTATOR CUFF REPAIR Right    Social History:  reports that he quit smoking about 36 years ago.  His smoking use included cigarettes. He has a 26.00 pack-year smoking history. He has never used smokeless tobacco. He reports that he does not drink alcohol or use drugs. Family History: family history includes Diabetes in his father and mother; Heart Disease in his father and mother. Allergies: Patient has no known allergies. Physical Exam           ED Triage Vitals [09/27/20 0955]   BP Temp Temp Source Pulse Resp SpO2 Height Weight   (!) 148/82 97.3 °F (36.3 °C) Infrared 82 16 97 % 5' 9.75\" (1.772 m) 225 lb (102.1 kg)     Oxygen Saturation Interpretation: Normal.    Constitutional:  Alert, development consistent with age. HEENT:  NC/NT. Airway patent. Eyes:  PERRL, EOMI, no discharge. Mouth:  Mucous membranes moist without lesions, tongue and gums normal.  Throat:  Pharynx without injection, exudate, or tonsillar hypertrophy. Airway patient. Neck:  Supple. No lymphadenopathy. Respiratory:  Clear to auscultation and breath sounds equal.  CV:  Regular rate and rhythm. GI:  Abdomen Soft, nontender, +BS. Integument:  Skin turgor: Normal.              Erythematous blanching weeping some linear some patchy rash arms and abdomen. Neurological:  Orientation age-appropriate unless noted elseware. Motor functions intact. Lab / Imaging Results   (All laboratory and radiology results have been personally reviewed by myself)  Labs:  No results found for this visit on 09/27/20. Imaging: All Radiology results interpreted by Radiologist unless otherwise noted. No orders to display       ED Course / Medical Decision Making     Medications   dexamethasone (DECADRON) injection 4 mg (has no administration in time range)        Consults:   None    Procedures:   none    MDM:   Rash more consistent with poison ivy poison oak there is no respiratory distress    Counseling:     The emergency provider has spoken with the patient and discussed todays results, in addition to providing specific details for the plan of care and counseling regarding the diagnosis and prognosis. Questions are answered at this time and they are agreeable with the plan. Assessment      1. Poison ivy dermatitis      Plan   Discharge to home  Patient condition is good    New Medications     New Prescriptions    HYDROXYZINE (VISTARIL) 25 MG CAPSULE    Take 1 capsule by mouth 3 times daily as needed for Itching    PREDNISONE (DELTASONE) 10 MG TABLET    Two tablets qd     Electronically signed by Jacklyn Lazaro MD   DD: 9/27/20  **This report was transcribed using voice recognition software. Every effort was made to ensure accuracy; however, inadvertent computerized transcription errors may be present.   END OF ED PROVIDER NOTE        Jacklyn Lazaro MD  09/27/20 4384

## 2020-09-28 ENCOUNTER — CARE COORDINATION (OUTPATIENT)
Dept: CARE COORDINATION | Age: 70
End: 2020-09-28

## 2020-09-28 NOTE — CARE COORDINATION
Patient contacted regarding recent discharge and COVID-19 risk. Discussed COVID-19 related testing which was not done at this time. Test results were not done. Patient informed of results, if available? No     Care Transition Nurse/ Ambulatory Care Manager contacted the patient by telephone to perform post discharge assessment. Verified name and  with patient as identifiers.     -Pt reports that the poison ivy on his arms & abdomin is drying up. Poison Alver Ang is less red, itchy less with the Vistaril, and weeping is gone. Pt said he is taking the Prednisone. No fever, cough or SOB. -PCP appt 10-6-2020  -Pt is active with MyChart. -ACM offered GetWell LOOP program, Pt declined. -Reviewed decision makers. Declined ACP Specialist referral.    -[address], Pt declined. Patient has following risk factors of: diabetes and HTN, Hyperlipidemia, Cancer prostate, Degenerative disc disease. CTN/ACM reviewed discharge instructions, medical action plan and red flags related to discharge diagnosis. Reviewed and educated them on any new and changed medications related to discharge diagnosis. Advised obtaining a 90-day supply of all daily and as-needed medications. Education provided regarding infection prevention, and signs and symptoms of COVID-19 and when to seek medical attention with patient who verbalized understanding. Discussed exposure protocols and quarantine from [address] you at higher risk for severe illness  and given an opportunity for questions and concerns. The patient agrees to contact the COVID-19 hotline [phone] or PCP office for questions related to their healthcare. CTN/ACM provided contact information for future reference. From CDC: Are you at higher risk for severe illness?  Wash your hands often.  Avoid close contact (6 feet, which is about two arm lengths) with people who are sick.    Put distance between yourself and other people if COVID-19 is spreading in your community.  Clean and disinfect frequently touched surfaces.  Avoid all cruise travel and non-essential air travel.  Call your healthcare professional if you have concerns about COVID-19 and your underlying condition or if you are sick. For more information on steps you can take to protect yourself, see CDC's How to [id number] [place] [address] for follow-up call in 7-14 days based on severity of symptoms and risk factors.

## 2020-10-01 ENCOUNTER — OFFICE VISIT (OUTPATIENT)
Dept: FAMILY MEDICINE CLINIC | Age: 70
End: 2020-10-01
Payer: MEDICARE

## 2020-10-01 VITALS
WEIGHT: 225 LBS | HEIGHT: 69 IN | HEART RATE: 92 BPM | RESPIRATION RATE: 16 BRPM | BODY MASS INDEX: 33.33 KG/M2 | TEMPERATURE: 97.9 F | OXYGEN SATURATION: 98 %

## 2020-10-01 PROCEDURE — 96372 THER/PROPH/DIAG INJ SC/IM: CPT | Performed by: PHYSICIAN ASSISTANT

## 2020-10-01 PROCEDURE — 99213 OFFICE O/P EST LOW 20 MIN: CPT | Performed by: PHYSICIAN ASSISTANT

## 2020-10-01 RX ORDER — TRIAMCINOLONE ACETONIDE 40 MG/ML
40 INJECTION, SUSPENSION INTRA-ARTICULAR; INTRAMUSCULAR ONCE
Status: COMPLETED | OUTPATIENT
Start: 2020-10-01 | End: 2020-10-01

## 2020-10-01 RX ORDER — FLUCONAZOLE 150 MG/1
TABLET ORAL
Qty: 2 TABLET | Refills: 0 | Status: SHIPPED
Start: 2020-10-01 | End: 2020-10-19

## 2020-10-01 RX ORDER — TRIAMCINOLONE ACETONIDE 1 MG/G
CREAM TOPICAL
Qty: 15 G | Refills: 1 | Status: SHIPPED | OUTPATIENT
Start: 2020-10-01

## 2020-10-01 RX ORDER — METHYLPREDNISOLONE 4 MG/1
TABLET ORAL
Qty: 1 KIT | Refills: 0 | Status: SHIPPED | OUTPATIENT
Start: 2020-10-01 | End: 2020-10-07

## 2020-10-01 RX ADMIN — TRIAMCINOLONE ACETONIDE 40 MG: 40 INJECTION, SUSPENSION INTRA-ARTICULAR; INTRAMUSCULAR at 10:16

## 2020-10-01 NOTE — PROGRESS NOTES
Chief Complaint:   Poison DELIA-CHÂTILLON (came in contact 2 wks ago , had steriods last week. it is starting to come back. on arms, and stomach )      History of Present Illness   Source of history provided by: patient. Becky Fuchs is a 79 y.o. old male who has a past medical history of:   Past Medical History:   Diagnosis Date    Anemia     Bleeding of eye     behind eye going to have surgery 7/2015    Hyperlipidemia     Hypertension     Myalgia     Prostate cancer [facility])     Prostate enlargement     follows Dr Kim De La Torre heart beat 03/2015    follows with Dr Hasmukh Cameron Type 2 diabetes mellitus without complication ([facility] [address]     Type II or unspecified type diabetes mellitus without mention of complication, not stated as uncontrolled     Vitamin D deficiency     Vitreous hemorrhage, right eye ([facility] [address]    Presents to the walk in clinic for re-evaluation of poison ivy. States the rash occurred shortly after working outdoors two weeks ago and suspects poison ivy exposure. He was seen in the ED for this on 9/27 and given a 4 mg shot of Decadron along with a 20 mg prednisone burst x 5 days. He was also prescribed Vistaril to use as needed. States symptoms improved initially but just started to return. Rash is most pronounced over his bilateral arms and left abdominal wall, however he has now noticed a rash over his bilateral groin folds. Reports associated erythema, mild burning, and pruritis. Denies any bleeding or drainage. Denies any lymphangitic streaking, fever, chills, HA , dyspnea, dysphagia, recent illness, myalgias, vomiting, or lethargy. Pt does have a history of T2DM, typically controlled with insulin. He does report his blood sugars have been running in the 230s on average.     ROS    Unless otherwise stated in this report or unable to obtain because of the patient's clinical or mental status as evidenced by the medical record, this patients's positive and negative responses for Review of Systems, functions intact. Lab / Imaging Results   (All laboratory and radiology results have been personally reviewed by myself)  Labs:      Imaging: All Radiology results interpreted by Radiologist unless otherwise noted. Assessment / Plan     Impression(s):  1. Contact dermatitis due to plant    2. Intertriginous candidiasis      Disposition:  Disposition: Discharge to home. Discussed with patient risk vs benefit of additional systemic steroid use including risk of continued hyperglycemia. He would like to proceed with this today and agrees to closely monitor his blood sugars at home and adjust insulin accordingly. ED immediately with any readings greater than 400 or signs of hyperglycemia. Kenalog injection administered, potential reactions discussed. Scripts written for a Medrol dose pack and topical triamcinolone cream, side effects and application directions discussed. Hold oral steroid until tomorrow. Avoid scratching to prevent the development of a secondary infection. Pt also does appear to have developed secondary candidiasis over his bilateral groin folds. Script also written for Diflucan, side effects discussed. F/u PCP in 3-5 days if symptoms persist. ED sooner if symptoms worsen or change. ED immediately with any fever, dyspnea, dysphagia, CP, spreading erythema, lymphangitic streaking, or additional signs of secondary infection which were discussed. Pt is in agreement with this care plan. All questions answered.       Administrations This Visit     triamcinolone acetonide (KENALOG-40) injection 40 mg     Admin Date  10/01/2020 Action  Given Dose  40 mg Route  Intramuscular Administered By  Tran Treadwell MA

## 2020-10-12 ENCOUNTER — CARE COORDINATION (OUTPATIENT)
Dept: CARE COORDINATION | Age: 70
End: 2020-10-12

## 2020-10-12 NOTE — CARE COORDINATION
ACM attempted to reach patient for 14 day follow up on recent ED visit for post ED COVID-19 Monitoring, however no answer. -HIPAA compliant VM left introducing self and left ACM's contact information requesting a return call. -ACM will sign off and resolve COVID 19 monitoring episode due to unable to reach if no return call today.

## 2020-10-12 NOTE — CARE COORDINATION
-Pt returned call to ACM. You Patient resolved from the Care Transitions episode on 10-. Discussed COVID-19 related testing which was not done at this time. Test results were not done. Patient informed of results, if available? No    Patient/family has been provided the following resources and education related to COVID-19:                         Signs, symptoms and red flags related to COVID-19            CDC exposure and quarantine guidelines            [employer] exposure contact - [phone]            Contact for their local Department of Health                 Patient currently reports that the following symptoms have improved:  -Pt reports that he is doing much better since he went to the walk in clinic on 10-1-2020 with medications prescribed. He said the redness is minimal, itch is minimal and burning is gone. No weeping. Pt completed the Diflucan, Medrol dose lory and Kenalog cream.   -PCP appt 10-.  -Pt in agreement with ACM signing off. No further outreach scheduled with this CTN/ACM. Episode of Care resolved. Patient has this CTN/ACM contact information if future needs arise.

## 2020-10-14 ENCOUNTER — HOSPITAL ENCOUNTER (OUTPATIENT)
Age: 70
Discharge: HOME | End: 2020-10-14
Payer: MEDICARE

## 2020-10-14 LAB
ALBUMIN SERPL-MCNC: 4.2 G/DL (ref 3.5–5.2)
ALP BLD-CCNC: 44 U/L (ref 40–129)
ALT SERPL-CCNC: 38 U/L (ref 0–40)
ANION GAP SERPL CALCULATED.3IONS-SCNC: 10 MMOL/L (ref 7–16)
AST SERPL-CCNC: 21 U/L (ref 0–39)
BASOPHILS ABSOLUTE: 0.07 E9/L (ref 0–0.2)
BASOPHILS RELATIVE PERCENT: 1.1 % (ref 0–2)
BILIRUB SERPL-MCNC: 0.5 MG/DL (ref 0–1.2)
BUN BLDV-MCNC: 16 MG/DL (ref 8–23)
CALCIUM SERPL-MCNC: 10.5 MG/DL (ref 8.6–10.2)
CHLORIDE BLD-SCNC: 107 MMOL/L (ref 98–107)
CHOLESTEROL, TOTAL: 117 MG/DL (ref 0–199)
CO2: 27 MMOL/L (ref 22–29)
CREAT SERPL-MCNC: 0.9 MG/DL (ref 0.7–1.2)
EOSINOPHILS ABSOLUTE: 0.27 E9/L (ref 0.05–0.5)
EOSINOPHILS RELATIVE PERCENT: 4.3 % (ref 0–6)
FOLATE: 14.4 NG/ML (ref 4.8–24.2)
GFR AFRICAN AMERICAN: >60
GFR NON-AFRICAN AMERICAN: >60 ML/MIN/1.73
GLUCOSE BLD-MCNC: 135 MG/DL (ref 74–99)
HBA1C MFR BLD: 8 % (ref 4–5.6)
HCT VFR BLD CALC: 43 % (ref 37–54)
HDLC SERPL-MCNC: 23 MG/DL
HEMOGLOBIN: 14.1 G/DL (ref 12.5–16.5)
IMMATURE GRANULOCYTES #: 0.02 E9/L
IMMATURE GRANULOCYTES %: 0.3 % (ref 0–5)
LDL CHOLESTEROL CALCULATED: 61 MG/DL (ref 0–99)
LYMPHOCYTES ABSOLUTE: 1.76 E9/L (ref 1.5–4)
LYMPHOCYTES RELATIVE PERCENT: 28.3 % (ref 20–42)
MCH RBC QN AUTO: 30.8 PG (ref 26–35)
MCHC RBC AUTO-ENTMCNC: 32.8 % (ref 32–34.5)
MCV RBC AUTO: 93.9 FL (ref 80–99.9)
MONOCYTES ABSOLUTE: 0.65 E9/L (ref 0.1–0.95)
MONOCYTES RELATIVE PERCENT: 10.4 % (ref 2–12)
NEUTROPHILS ABSOLUTE: 3.46 E9/L (ref 1.8–7.3)
NEUTROPHILS RELATIVE PERCENT: 55.6 % (ref 43–80)
PDW BLD-RTO: 13.8 FL (ref 11.5–15)
PLATELET # BLD: 189 E9/L (ref 130–450)
PMV BLD AUTO: 12.2 FL (ref 7–12)
POTASSIUM SERPL-SCNC: 5 MMOL/L (ref 3.5–5)
PROSTATE SPECIFIC ANTIGEN: <0.03 NG/ML (ref 0–4)
RBC # BLD: 4.58 E12/L (ref 3.8–5.8)
SODIUM BLD-SCNC: 144 MMOL/L (ref 132–146)
TOTAL CK: 223 U/L (ref 20–200)
TOTAL PROTEIN: 7.4 G/DL (ref 6.4–8.3)
TRIGL SERPL-MCNC: 163 MG/DL (ref 0–149)
TSH SERPL DL<=0.05 MIU/L-ACNC: 2.51 UIU/ML (ref 0.27–4.2)
VITAMIN B-12: 543 PG/ML (ref 211–946)
VITAMIN D 25-HYDROXY: 25 NG/ML (ref 30–100)
VLDLC SERPL CALC-MCNC: 33 MG/DL
WBC # BLD: 6.2 E9/L (ref 4.5–11.5)

## 2020-10-14 PROCEDURE — 80053 COMPREHEN METABOLIC PANEL: CPT

## 2020-10-14 PROCEDURE — 82746 ASSAY OF FOLIC ACID SERUM: CPT

## 2020-10-14 PROCEDURE — 85025 COMPLETE CBC W/AUTO DIFF WBC: CPT

## 2020-10-14 PROCEDURE — 86803 HEPATITIS C AB TEST: CPT

## 2020-10-14 PROCEDURE — 82607 VITAMIN B-12: CPT

## 2020-10-14 PROCEDURE — 84153 ASSAY OF PSA TOTAL: CPT

## 2020-10-14 PROCEDURE — 80061 LIPID PANEL: CPT

## 2020-10-14 PROCEDURE — 83036 HEMOGLOBIN GLYCOSYLATED A1C: CPT

## 2020-10-14 PROCEDURE — 36415 COLL VENOUS BLD VENIPUNCTURE: CPT

## 2020-10-14 PROCEDURE — 82550 ASSAY OF CK (CPK): CPT

## 2020-10-14 PROCEDURE — 82306 VITAMIN D 25 HYDROXY: CPT

## 2020-10-14 PROCEDURE — 84443 ASSAY THYROID STIM HORMONE: CPT

## 2020-10-14 NOTE — RESULT ENCOUNTER NOTE
Hemoglobin A1c up quite a bit at 8.0.   Watch diet, continue per endocrinology, keep follow-up with me to review more detail sooner as needed

## 2020-10-15 LAB — HEPATITIS C ANTIBODY INTERPRETATION: NORMAL

## 2020-10-19 ENCOUNTER — OFFICE VISIT (OUTPATIENT)
Dept: PRIMARY CARE CLINIC | Age: 70
End: 2020-10-19
Payer: MEDICARE

## 2020-10-19 VITALS
BODY MASS INDEX: 33.23 KG/M2 | DIASTOLIC BLOOD PRESSURE: 70 MMHG | TEMPERATURE: 98.2 F | SYSTOLIC BLOOD PRESSURE: 136 MMHG | WEIGHT: 225 LBS

## 2020-10-19 PROCEDURE — 3052F HG A1C>EQUAL 8.0%<EQUAL 9.0%: CPT | Performed by: FAMILY MEDICINE

## 2020-10-19 PROCEDURE — 99214 OFFICE O/P EST MOD 30 MIN: CPT | Performed by: FAMILY MEDICINE

## 2020-10-19 PROCEDURE — G0008 ADMIN INFLUENZA VIRUS VAC: HCPCS | Performed by: FAMILY MEDICINE

## 2020-10-19 PROCEDURE — 90694 VACC AIIV4 NO PRSRV 0.5ML IM: CPT | Performed by: FAMILY MEDICINE

## 2020-10-19 RX ORDER — ATORVASTATIN CALCIUM 40 MG/1
40 TABLET, FILM COATED ORAL DAILY
Qty: 90 TABLET | Refills: 3 | Status: SHIPPED
Start: 2020-10-19 | End: 2021-09-16 | Stop reason: SDUPTHER

## 2020-10-19 RX ORDER — METOPROLOL SUCCINATE 25 MG/1
25 TABLET, EXTENDED RELEASE ORAL DAILY
Qty: 90 TABLET | Refills: 3 | Status: SHIPPED
Start: 2020-10-19 | End: 2021-09-16 | Stop reason: SDUPTHER

## 2020-10-19 RX ORDER — QUINAPRIL 20 MG/1
20 TABLET ORAL DAILY
Qty: 90 TABLET | Refills: 3 | Status: SHIPPED
Start: 2020-10-19 | End: 2021-09-16 | Stop reason: SDUPTHER

## 2020-10-19 NOTE — PROGRESS NOTES
19  Luly Mallory : 1950 Sex: male  Age: 79 y.o. Chief Complaint   Patient presents with    3 Month Follow-Up     Follow-up chronic medical conditions       HPI:    Patient presents today for follow-up. Feels well. Notes chronic hearing loss and right-sided tinnitus. Going to see hearing specialist.  No complaints or concerns at this time. Following with specialists.     Blood work reviewed, blames his A1c on  Taking prednisone, up to 8.0, CMP normal except glucose 135 calcium chronically elevated/fluctuates, 10.5, HDL chronically low 23 LDL 61 triglyceride 163, TSH 2.5 vitamin D slightly low 25 CBC grossly normal differential reviewed vitamin L66 normal 656 folic acid 41.6 PSA less than 0.03 hepatitis C nonreactive      Most Recent Labs  CBC  Lab Results   Component Value Date    WBC 6.2 10/14/2020    WBC 6.8 2020    WBC 6.1 2019    RBC 4.58 10/14/2020    RBC 4.79 2020    RBC 4.93 2019    HGB 14.1 10/14/2020    HGB 14.2 2020    HGB 14.6 2019    HCT 43.0 10/14/2020    HCT 42.2 2020    HCT 44.0 2019    MCV 93.9 10/14/2020    MCV 88.1 2020    MCV 89.2 2019     10/14/2020     2020     2019      CMP  Lab Results   Component Value Date     10/14/2020     2020     2019    K 5.0 10/14/2020    K 4.7 2020    K 4.5 2019     10/14/2020     2020     2019    CO2 27 10/14/2020    CO2 29 2020    CO2 29 2019    ANIONGAP 10 10/14/2020    GLUCOSE 135 10/14/2020    GLUCOSE 103 2020    GLUCOSE 112 2019    BUN 16 10/14/2020    BUN 19 2020    BUN 22 2019    CREATININE 0.9 10/14/2020    CREATININE 0.88 2020    CREATININE 0.95 2019    LABGLOM >60 10/14/2020    GFRAA >60 10/14/2020    CALCIUM 10.5 10/14/2020    CALCIUM 10.0 2020    CALCIUM 10.5 2019    PROT 7.4 10/14/2020    LABALBU 4.2 10/14/2020 LABALBU 4.5 05/29/2020    LABALBU 4.6 11/23/2019    BILITOT 0.5 10/14/2020    BILITOT 0.6 05/29/2020    BILITOT 0.6 11/23/2019    ALKPHOS 44 10/14/2020    ALKPHOS 42 05/29/2020    ALKPHOS 45 11/23/2019    AST 21 10/14/2020    AST 23 05/29/2020    AST 17 11/23/2019    ALT 38 10/14/2020    ALT 33 05/29/2020    ALT 27 11/23/2019     A1C  Lab Results   Component Value Date    LABA1C 8.0 10/14/2020    LABA1C 6.6 06/11/2019    LABA1C 5.9 02/13/2018     TSH  Lab Results   Component Value Date    TSH 2.510 10/14/2020    TSH 2.03 05/29/2020    TSH 2.17 11/23/2019     FREET4  No results found for: P5NGECT  LIPID  Lab Results   Component Value Date    CHOL 117 10/14/2020    CHOL 106 05/29/2020    CHOL 119 11/23/2019    HDL 23 10/14/2020    HDL 23 05/29/2020    HDL 21 11/23/2019    LDLCALC 61 10/14/2020    LDLCALC 59 05/29/2020    LDLCALC 71 11/23/2019    TRIG 163 10/14/2020    TRIG 165 05/29/2020    TRIG 208 11/23/2019    CHOLHDLRATIO 4.6 05/29/2020    CHOLHDLRATIO 5.7 11/23/2019     VITAMIN D  Lab Results   Component Value Date    VITD25 25 10/14/2020    VITD25 34 05/29/2020     MAGNESIUM  No results found for: MG   PHOS  No results found for: PHOS   MORALES   No results found for: MORALES  RHEUMATOID FACTOR  No results found for: RF  PSA  Lab Results   Component Value Date    PSA <0.03 10/14/2020    PSA 0.1 05/29/2020      HEPATITIS C  Lab Results   Component Value Date    HCVABI Non-Reactive 10/14/2020     HIV  No results found for: LOH6WAW, HIV1QT  UA  Lab Results   Component Value Date    COLORU Yellow 05/29/2020    COLORU Yellow 11/23/2019    CLARITYU Clear 05/29/2020    CLARITYU Clear 11/23/2019    GLUCOSEU negative 05/29/2020    GLUCOSEU neg 11/23/2019    BILIRUBINUR Negative 05/29/2020    BILIRUBINUR Negative 11/23/2019    KETUA Negative 05/29/2020    KETUA Negative 11/23/2019    BLOODU Negative 05/29/2020    BLOODU Negative 11/23/2019    PHUR  11/23/2019      Comment:      < or = 5.0    PROTEINU Negative 05/29/2020 PROTEINU Negative 11/23/2019    UROBILINOGEN Normal 11/23/2019    NITRU Negative 05/29/2020    NITRU Negative 11/23/2019    LEUKOCYTESUR Negative 05/29/2020    LEUKOCYTESUR Negative 11/23/2019     Urine Micro/Albumin Ratio  Lab Results   Component Value Date    MALBCR 7 05/29/2020    MALBCR 6 11/23/2019    MALBCR 6 07/29/2019           Review of Systems  ROS:  Const: Denies chills, fever, malaise and sweats. Eyes: Chronic Eye sxs    ENMT: Denies earaches, other ear symptoms other than chronic hearing loss and right-sided tinnitus. . Denies nasal or sinus symptoms other than stated  above. Denies mouth and tongue lesions and sore throat. CV: Denies chest discomfort, pain; diaphoresis, dizziness, edema, lightheadedness, orthopnea,  palpitations, syncope and near syncopal episode or any exertional symptoms  Resp: Denies cough, hemoptysis, pleuritic pain, SOB, sputum production and wheezing. GI: Denies abdominal pain, change in bowel habits, hematochezia, melena, nausea and vomiting. : Denies urinary symptoms including dysuria , urgency, frequency or hematuria. Musculo: Denies musculoskeletal symptoms. Skin: Denies bruising and rash.   Neuro: Denies headache, numbness, stiff neck, tingling and focal weakness slurred speech or facial  droop  Hema/Lymph: Denies bleeding/bruising tendency and enlarged lymph nodes        Current Outpatient Medications:     atorvastatin (LIPITOR) 40 MG tablet, Take 1 tablet by mouth daily, Disp: 90 tablet, Rfl: 3    metFORMIN (GLUCOPHAGE) 1000 MG tablet, Take 1 tablet by mouth 2 times daily (with meals), Disp: 180 tablet, Rfl: 3    metoprolol succinate (TOPROL XL) 25 MG extended release tablet, Take 1 tablet by mouth daily, Disp: 90 tablet, Rfl: 3    quinapril (ACCUPRIL) 20 MG tablet, Take 1 tablet by mouth daily, Disp: 90 tablet, Rfl: 3    triamcinolone (KENALOG) 0.1 % cream, Apply topically 2 times daily for no more than 2 weeks, Disp: 15 g, Rfl: 1    diclofenac sodium Topics    Alcohol use: No    Drug use: No      Social History     Social History Narrative    PMH:    Past Medical History: diabetes mellitus type II insulin requiring, hyperlipidemia, hypertension, Peyronie's    disease, prostate cancer, hypercalcemia, vitreous hemmorage B/L            Family Medical History: Dad  of diabetes complications at age 67. He had two myocardial infarction,    first at age 58 and 4-5 cerebrovascular accidents afterwards. Mom did not have coronary artery disease    that he is aware of but did have diabetes and  at the age of 80 of diabetic complications. Sister     at age 62 of diabetic complications, \"did not take care of herself\", had significant obesity, renal failure and    was on dialysis. He states mom had peripheral vascular disease and gangrene which lead to renal    failure and a cascade of events. Surgical Hx:    Rotator Cuff - RT Early     B/L Cataract    Vitreous Hemmorage B/L - SCAR TISSUE LEFT - BLIND    RIGHT EYE DOING OK. MULTIPLE SURGERIES ON BOTH    Left Shoulder - Rotator Cuff Dr Vicky Lam     SH: Marital: , With 2 Children, Legal Status: . Personal Habits: Cigarette Use: Former Cigarette Smoker - Quit . Alcohol: Occasionally    consumes alcohol. Exercise Type: Walks 4 times a week, Employed Through [employer] -    Retired. .    Reviewed and updated. Vitals:    10/19/20 1008   BP: 136/70   Temp: 98.2 °F (36.8 °C)   Weight: 225 lb (102.1 kg)      Wt Readings from Last 3 Encounters:   10/19/20 225 lb (102.1 kg)   10/01/20 225 lb (102.1 kg)   20 225 lb (102.1 kg)        Physical Exam  Exam:  Const: Appears comfortable. No signs of acute distress present. Head/Face: Atraumatic on inspection. Eyes: EOMI in both eyes. PERRL. No injection  ENMT: Auditory canals normal. Tympanic membranes: intact and translucent. External nose WNL. Nasal mucosa is boggy oropharynx: No erythema or exudate.  Posterior pharynx is watery postnasal drainage neck: Supple. Palpation reveals no adenopathy. No masses appreciated. No JVD. Carotids: no  bruits. Resp: Respirations are unlabored. Clear to auscultation. No rales, rhonchi or wheezes appreciated  over the lungs bilaterally. CV: Rate is regular. Rhythm is regular. No gallop or rubs. No heart murmur appreciated. Extremities: No clubbing, cyanosis, or edema. No calf inflammation or tenderness. Abdomen: Bowel sounds are normoactive. Abdomen is soft, nontender, and nondistended. No  abdominal masses. No palpable hepatosplenomegaly. Lymph: No palpable or visible regional lymphadenopathy. Musculoskeletal: no acute joint inflammation. Skin: Dry and warm with no rash. Skin normal to inspection and palpation overall. Neuro: Alert and oriented. Affect: appropriate. Upper Extremities: 5/5 bilaterally. Lower Extremities:  5/5 bilaterally. Sensation intact to light touch. Reflexes: DTR's are symmetric and 2+ bilaterally. .  Cranial Nerves: Cranial nerves grossly intact. Assessment and Plan:   Diagnosis Orders   1. Mixed hyperlipidemia  atorvastatin (LIPITOR) 40 MG tablet    CBC Auto Differential    CK    Comprehensive Metabolic Panel    Lipid Panel    TSH without Reflex   2. Type 2 diabetes mellitus with complication (HCC)  metFORMIN (GLUCOPHAGE) 1000 MG tablet    quinapril (ACCUPRIL) 20 MG tablet    CBC Auto Differential    Hemoglobin A1C    Urinalysis    Microalbumin / Creatinine Urine Ratio    TSH without Reflex   3. Hypertension, essential  metoprolol succinate (TOPROL XL) 25 MG extended release tablet    quinapril (ACCUPRIL) 20 MG tablet    CBC Auto Differential    TSH without Reflex   4. Carcinoma in situ of prostate  CBC Auto Differential    TSH without Reflex   5. Vitamin B deficiency  CBC Auto Differential    Vitamin B12 & Folate    TSH without Reflex   6. Vitamin D deficiency, unspecified  CBC Auto Differential    Vitamin D 25 Hydroxy    TSH without Reflex   7. Hypercalcemia  CBC Auto Differential    TSH without Reflex   8. Class 1 obesity due to excess calories without serious comorbidity with body mass index (BMI) of 34.0 to 34.9 in adult  CBC Auto Differential    TSH without Reflex   9. Primary malignant neoplasm of prostate (HCC)  CBC Auto Differential    TSH without Reflex    PSA, Diagnostic   10. Health maintenance examination  INFLUENZA, QUADV, ADJUVANTED, 65 YRS =, IM, PF, PREFILL SYR, 0.5ML (FLUAD)       No problem-specific Assessment & Plan notes found for this encounter. Hypercalcemia   Flucates, . Declines further evaluation or treatment other than as noted. Notify me if changes  mind. Signs and symptoms to watch for discussed. Serious signs and symptoms  reviewed. ER if any. Has been somewhat labile. He is asymptomatic. He will follow  with Dr. Nidhi Carrera. Potential seriousness reviewed. declines malignancy screening  or repeat bone density  History of vitamin D deficiency on supplementation as well. Monitor levels.     Type 2 diabetes mellitus with complication (HCC)  following with Dr. Nidhi Carrera. Watch BS closely ambulatory. Parameters given. macro  and microvascular complications reviewed. Call if not in range. ER if dangerous  numbers. hyper and hypoglycemic precautions reviewed. con't per Dr Nidhi Carrera. Discussed regular eye exams, daily foot examinations. on high  dose insulin. risk of hypoglycemia and tx reviewed. On quinapril, risks benefits  reviewed   hemoglobin A1c quite elevated this time, he blames prednisone, defer to Dr. Nidhi Carrera     Mixed hyperlipidemia  Overall doing well. Continue current therapy. lifestyle modification reviewed. risk of  hyperlipidemia reviewed tolerating therapy. Niaspan and fenofibrate was discontinued, Niaspan because of cost. Could consider  fenofibrate if triglycerides mandate , mildly elevated, HDL chronically low. goals reviewed.  declines  change in tx.   Wants to continue current tx.  Proper hydration reviewed     Hypertension, essential  Watch ambulatory, parameters given. If out of range, notify. If dangerous numbers,  directly to ER. Risk of HTN reviewed. lifestyle modification emphasized. Risks of  hypertension and hypotension reviewed. Tolerating therapy. Tolerating Toprol. And quinapril.     Carcinoma in situ of prostate  Continue per specialists. Chasity . PSA <0.03 on 6/20.  I recommended he  follow-up with urology still with history of prostate cancer, history of seeds. No longer  seeing Dr. Frank Felt . Would like us to monitor PSA. Defers exam     Anemia  chronic, stable, normal now. Counseled extensively. Differential reviewed, including  serious etiologies. Declines further evaluation/treatment. Had colonoscopy 4/14 dr Kanika Morales; small patch of telengectasia. recommended repeat 5-7 yrs . He is in no  hurry . He has been stable. Declines FIT or cscope before 2021. Currently normal     Vitamin B deficiency  Stable, monitor ; cont otc     Vitamin D deficiency, unspecified  stable, increase vitamin D3 2000 IUs daily to 4000 IUs daily during winter months. Health maintenance examination  Health maintenance issues discussed at length 9/20. Flu vaccine today. Benedetta Ou [facility] screen neg 12/19       Plan as above. Counseled extensively and differential diagnoses relevant to above were reviewed, including serious etiologies. Side effects and interactions of medications were reviewed. Continue per specialists. Precautions reviewed. Declines blood work and follow-up for 4 months sooner as needed. As long as symptoms steadily improve/resolve, and medical conditions follow the expected course, FU as below, sooner PRN. Return in about 4 months (around 2/19/2021), or if symptoms worsen or fail to improve. Signs and symptoms to watch for discussed, serious signs and symptoms reviewed. ER if any.        Clemencia Dolan MD    Patients are advised to check with insurance company to ensure coverage

## 2021-02-15 LAB
ALBUMIN SERPL-MCNC: 4.3 G/DL
ALP BLD-CCNC: 45 U/L
ALT SERPL-CCNC: 26 U/L
ANION GAP SERPL CALCULATED.3IONS-SCNC: NORMAL MMOL/L
AST SERPL-CCNC: 16 U/L
AVERAGE GLUCOSE: NORMAL
BASOPHILS ABSOLUTE: 50 /ΜL
BASOPHILS RELATIVE PERCENT: 0.8 %
BILIRUB SERPL-MCNC: 0.6 MG/DL (ref 0.1–1.4)
BILIRUBIN, URINE: NEGATIVE
BLOOD, URINE: NEGATIVE
BUN BLDV-MCNC: 17 MG/DL
CALCIUM SERPL-MCNC: 9.8 MG/DL
CHLORIDE BLD-SCNC: 106 MMOL/L
CHOLESTEROL, TOTAL: 102 MG/DL
CHOLESTEROL/HDL RATIO: 4.4
CLARITY: CLEAR
CO2: 31 MMOL/L
COLOR: YELLOW
CREAT SERPL-MCNC: 0.88 MG/DL
CREATININE, URINE: 118
EOSINOPHILS ABSOLUTE: 118 /ΜL
EOSINOPHILS RELATIVE PERCENT: 1.9 %
FOLATE: 18.6
GFR CALCULATED: 87
GLUCOSE BLD-MCNC: 117 MG/DL
GLUCOSE URINE: NORMAL
HBA1C MFR BLD: 7.4 %
HCT VFR BLD CALC: 44.8 % (ref 41–53)
HDLC SERPL-MCNC: 23 MG/DL (ref 35–70)
HEMOGLOBIN: 14.8 G/DL (ref 13.5–17.5)
KETONES, URINE: NEGATIVE
LDL CHOLESTEROL CALCULATED: 58 MG/DL (ref 0–160)
LEUKOCYTE ESTERASE, URINE: NEGATIVE
LYMPHOCYTES ABSOLUTE: 1841 /ΜL
LYMPHOCYTES RELATIVE PERCENT: 29.7 %
MCH RBC QN AUTO: 30 PG
MCHC RBC AUTO-ENTMCNC: 33 G/DL
MCV RBC AUTO: 90.7 FL
MICROALBUMIN/CREAT 24H UR: 2.7 MG/G{CREAT}
MICROALBUMIN/CREAT UR-RTO: 23
MONOCYTES ABSOLUTE: 577 /ΜL
MONOCYTES RELATIVE PERCENT: 9.3 %
NEUTROPHILS ABSOLUTE: 3615 /ΜL
NEUTROPHILS RELATIVE PERCENT: 58.3 %
NITRITE, URINE: NEGATIVE
NONHDLC SERPL-MCNC: 79 MG/DL
PDW BLD-RTO: 13.3 %
PH UA: 5.5 (ref 4.5–8)
PLATELET # BLD: 225 K/ΜL
PMV BLD AUTO: 13 FL
POTASSIUM SERPL-SCNC: 4.3 MMOL/L
PROSTATE SPECIFIC ANTIGEN: NORMAL
PROTEIN UA: NEGATIVE
RBC # BLD: 4.94 10^6/ΜL
SODIUM BLD-SCNC: 143 MMOL/L
SPECIFIC GRAVITY, URINE: 1.02
TOTAL CK: 128 U/L
TOTAL PROTEIN: 6.8
TRIGL SERPL-MCNC: 124 MG/DL
TSH SERPL DL<=0.05 MIU/L-ACNC: 2.04 UIU/ML
UROBILINOGEN, URINE: NORMAL
VITAMIN B-12: 495
VITAMIN D 25-HYDROXY: 31
VITAMIN D2, 25 HYDROXY: NORMAL
VITAMIN D3,25 HYDROXY: NORMAL
VLDLC SERPL CALC-MCNC: ABNORMAL MG/DL
WBC # BLD: 6.2 10^3/ML

## 2021-02-16 DIAGNOSIS — E66.09 CLASS 1 OBESITY DUE TO EXCESS CALORIES WITHOUT SERIOUS COMORBIDITY WITH BODY MASS INDEX (BMI) OF 34.0 TO 34.9 IN ADULT: ICD-10-CM

## 2021-02-16 DIAGNOSIS — E78.2 MIXED HYPERLIPIDEMIA: ICD-10-CM

## 2021-02-16 DIAGNOSIS — C61 PRIMARY MALIGNANT NEOPLASM OF PROSTATE (HCC): ICD-10-CM

## 2021-02-16 DIAGNOSIS — E55.9 VITAMIN D DEFICIENCY, UNSPECIFIED: ICD-10-CM

## 2021-02-16 DIAGNOSIS — E53.9 VITAMIN B DEFICIENCY: ICD-10-CM

## 2021-02-16 DIAGNOSIS — E83.52 HYPERCALCEMIA: ICD-10-CM

## 2021-02-16 DIAGNOSIS — D07.5 CARCINOMA IN SITU OF PROSTATE: ICD-10-CM

## 2021-02-16 DIAGNOSIS — I10 HYPERTENSION, ESSENTIAL: ICD-10-CM

## 2021-02-16 DIAGNOSIS — E11.8 TYPE 2 DIABETES MELLITUS WITH COMPLICATION (HCC): ICD-10-CM

## 2021-02-19 ENCOUNTER — OFFICE VISIT (OUTPATIENT)
Dept: PRIMARY CARE CLINIC | Age: 71
End: 2021-02-19
Payer: MEDICARE

## 2021-02-19 VITALS
WEIGHT: 227 LBS | BODY MASS INDEX: 33.52 KG/M2 | HEART RATE: 55 BPM | OXYGEN SATURATION: 96 % | DIASTOLIC BLOOD PRESSURE: 68 MMHG | TEMPERATURE: 96.5 F | SYSTOLIC BLOOD PRESSURE: 132 MMHG

## 2021-02-19 DIAGNOSIS — D07.5 CARCINOMA IN SITU OF PROSTATE: ICD-10-CM

## 2021-02-19 DIAGNOSIS — Z00.00 HEALTH MAINTENANCE EXAMINATION: ICD-10-CM

## 2021-02-19 DIAGNOSIS — E55.9 VITAMIN D DEFICIENCY, UNSPECIFIED: ICD-10-CM

## 2021-02-19 DIAGNOSIS — E78.2 MIXED HYPERLIPIDEMIA: ICD-10-CM

## 2021-02-19 DIAGNOSIS — I10 HYPERTENSION, ESSENTIAL: ICD-10-CM

## 2021-02-19 DIAGNOSIS — E53.9 VITAMIN B DEFICIENCY: ICD-10-CM

## 2021-02-19 DIAGNOSIS — E11.8 TYPE 2 DIABETES MELLITUS WITH COMPLICATION (HCC): Primary | ICD-10-CM

## 2021-02-19 DIAGNOSIS — D64.9 ANEMIA, UNSPECIFIED TYPE: ICD-10-CM

## 2021-02-19 PROCEDURE — 99214 OFFICE O/P EST MOD 30 MIN: CPT | Performed by: FAMILY MEDICINE

## 2021-02-19 PROCEDURE — 3051F HG A1C>EQUAL 7.0%<8.0%: CPT | Performed by: FAMILY MEDICINE

## 2021-02-19 ASSESSMENT — PATIENT HEALTH QUESTIONNAIRE - PHQ9
SUM OF ALL RESPONSES TO PHQ QUESTIONS 1-9: 0
2. FEELING DOWN, DEPRESSED OR HOPELESS: 0
SUM OF ALL RESPONSES TO PHQ9 QUESTIONS 1 & 2: 0
SUM OF ALL RESPONSES TO PHQ QUESTIONS 1-9: 0

## 2021-02-19 NOTE — PROGRESS NOTES
19  Yousuf Magaña : 1950 Sex: male  Age: 79 y.o. Chief Complaint   Patient presents with    Discuss Labs       HPI:    OVerall feels well. A1C coming down, but he is usu < 7, blames pandemic at this point. Going to cont to work on it    a1c 8-7. 4. psa <0.01, HDL 23, LDL 58,     Most Recent Labs  CBC  Lab Results   Component Value Date    WBC 6.2 2021    WBC 6.2 10/14/2020    WBC 6.8 2020    RBC 4.94 2021    RBC 4.58 10/14/2020    RBC 4.79 2020    HGB 14.8 2021    HGB 14.1 10/14/2020    HGB 14.2 2020    HCT 44.8 2021    HCT 43.0 10/14/2020    HCT 42.2 2020    MCV 90.7 2021    MCV 93.9 10/14/2020    MCV 88.1 2020     2021     10/14/2020     2020      CMP  Lab Results   Component Value Date     2021     10/14/2020     2020    K 4.3 2021    K 5.0 10/14/2020    K 4.7 2020     2021     10/14/2020     2020    CO2 31 2021    CO2 27 10/14/2020    CO2 29 2020    ANIONGAP 10 10/14/2020    GLUCOSE 117 2021    GLUCOSE 135 10/14/2020    GLUCOSE 103 2020    BUN 17 2021    BUN 16 10/14/2020    BUN 19 2020    CREATININE 0.88 2021    CREATININE 0.9 10/14/2020    CREATININE 0.88 2020    LABGLOM 87 2021    LABGLOM >60 10/14/2020    GFRAA >60 10/14/2020    CALCIUM 9.8 2021    CALCIUM 10.5 10/14/2020    CALCIUM 10.0 2020    PROT 7.4 10/14/2020    LABALBU 4.3 2021    LABALBU 4.2 10/14/2020    LABALBU 4.5 2020    BILITOT 0.6 2021    BILITOT 0.5 10/14/2020    BILITOT 0.6 2020    ALKPHOS 45 2021    ALKPHOS 44 10/14/2020    ALKPHOS 42 2020    AST 16 2021    AST 21 10/14/2020    AST 23 2020    ALT 26 2021    ALT 38 10/14/2020    ALT 33 2020     A1C  Lab Results   Component Value Date    LABA1C 7.4 2021    LABA1C 8.0 10/14/2020 LABA1C 6.6 06/11/2019     TSH  Lab Results   Component Value Date    TSH 2.04 02/13/2021    TSH 2.510 10/14/2020    TSH 2.03 05/29/2020     FREET4  No results found for: L6POANN  LIPID  Lab Results   Component Value Date    CHOL 102 02/13/2021    CHOL 117 10/14/2020    CHOL 106 05/29/2020    HDL 23 02/13/2021    HDL 23 10/14/2020    HDL 23 05/29/2020    LDLCALC 58 02/13/2021    LDLCALC 61 10/14/2020    LDLCALC 59 05/29/2020    TRIG 124 02/13/2021    TRIG 163 10/14/2020    TRIG 165 05/29/2020    CHOLHDLRATIO 4.4 02/13/2021    CHOLHDLRATIO 4.6 05/29/2020    CHOLHDLRATIO 5.7 11/23/2019     VITAMIN D  Lab Results   Component Value Date    VITD25 31 02/13/2021    VITD25 25 10/14/2020    VITD25 34 05/29/2020     MAGNESIUM  No results found for: MG   PHOS  No results found for: PHOS   MORALES   No results found for: MORALES  RHEUMATOID FACTOR  No results found for: RF  PSA  Lab Results   Component Value Date    PSA  02/13/2021      Comment:      <0.1    PSA <0.03 10/14/2020    PSA 0.1 05/29/2020      HEPATITIS C  Lab Results   Component Value Date    HCVABI Non-Reactive 10/14/2020     HIV  No results found for: [id number], HIV1QT  UA  Lab Results   Component Value Date    COLORU Yellow 02/13/2021    COLORU Yellow 05/29/2020    COLORU Yellow 11/23/2019    CLARITYU Clear 02/13/2021    CLARITYU Clear 05/29/2020    CLARITYU Clear 11/23/2019    GLUCOSEU 1+ 02/13/2021    GLUCOSEU negative 05/29/2020    GLUCOSEU neg 11/23/2019    BILIRUBINUR Negative 02/13/2021    BILIRUBINUR Negative 05/29/2020    BILIRUBINUR Negative 11/23/2019    KETUA Negative 02/13/2021    KETUA Negative 05/29/2020    KETUA Negative 11/23/2019    BLOODU Negative 02/13/2021    BLOODU Negative 05/29/2020    BLOODU Negative 11/23/2019    PHUR 5.5 02/13/2021    PHUR  11/23/2019      Comment:      < or = 5.0    PROTEINU Negative 02/13/2021    PROTEINU Negative 05/29/2020    PROTEINU Negative 11/23/2019    UROBILINOGEN Normal 11/23/2019    NITRU Negative 02/13/2021 NITRU Negative 05/29/2020    NITRU Negative 11/23/2019    LEUKOCYTESUR Negative 02/13/2021    LEUKOCYTESUR Negative 05/29/2020    LEUKOCYTESUR Negative 11/23/2019     Urine Micro/Albumin Ratio  Lab Results   Component Value Date    MALBCR 23 02/13/2021    MALBCR 7 05/29/2020    MALBCR 6 11/23/2019             ROS:  Const: Denies chills, fever, malaise and sweats. Eyes: Chronic Eye sxs    ENMT: Denies earaches, other ear symptoms other than chronic hearing loss and right-sided tinnitus. . Denies nasal or sinus symptoms other than stated  above. Denies mouth and tongue lesions and sore throat. CV: Denies chest discomfort, pain; diaphoresis, dizziness, edema, lightheadedness, orthopnea,  palpitations, syncope and near syncopal episode or any exertional symptoms  Resp: Denies cough, hemoptysis, pleuritic pain, SOB, sputum production and wheezing. GI: Denies abdominal pain, change in bowel habits, hematochezia, melena, nausea and vomiting. : Denies urinary symptoms including dysuria , urgency, frequency or hematuria. Musculo: Had left hip injection by Dr. Marly Nieto, sedated under fluoroscopy. It did alleviate the symptoms although feels like it is starting to come back. He will follow-up with him. Skin: Denies bruising and rash.   Neuro: Denies headache, numbness, stiff neck, tingling and focal weakness slurred speech or facial  droop  Hema/Lymph: Denies bleeding/bruising tendency and enlarged lymph nodes        Current Outpatient Medications:     atorvastatin (LIPITOR) 40 MG tablet, Take 1 tablet by mouth daily, Disp: 90 tablet, Rfl: 3    metFORMIN (GLUCOPHAGE) 1000 MG tablet, Take 1 tablet by mouth 2 times daily (with meals), Disp: 180 tablet, Rfl: 3    metoprolol succinate (TOPROL XL) 25 MG extended release tablet, Take 1 tablet by mouth daily, Disp: 90 tablet, Rfl: 3    quinapril (ACCUPRIL) 20 MG tablet, Take 1 tablet by mouth daily, Disp: 90 tablet, Rfl: 3    triamcinolone (KENALOG) 0.1 % cream, Apply topically 2 times daily for no more than 2 weeks, Disp: 15 g, Rfl: 1    diclofenac sodium (VOLTAREN) 1 % GEL, 4g qid prn lower ext joints (knees,ankles,feet) max 16g/d. (spine,hip,shoulder use has not been evaluated), Disp: 1 Tube, Rfl: 3    fenofibrate (TRICOR) 54 MG tablet,  Take 54 mg by mouth daily , Disp: , Rfl:     HUMALOG MIX 75/25 KWIKPEN (75-25) 100 UNIT/ML SUPN injection pen, 106 Units 2 times daily (with meals) , Disp: , Rfl:     aspirin 81 MG tablet,  Take 81 mg by mouth daily Prescribed per Dr Arjun Duncan. Contact Dr Gilberto Warner preop instructions. , Disp: , Rfl:     vitamin B-12 (CYANOCOBALAMIN) 100 MCG tablet, Take 1,000 mcg by mouth daily, Disp: , Rfl:     vitamin D (CHOLECALCIFEROL) 1000 UNITS TABS tablet, Take 2,000 Units by mouth daily, Disp: , Rfl:   No Known Allergies    Past Medical History:   Diagnosis Date    Anemia     Bleeding of eye     behind eye going to have surgery 7/2015    Hyperlipidemia     Hypertension     Myalgia     Prostate [facility] ([facility] [address]     Prostate enlargement     follows Dr Nadine Martinez heart beat 03/2015    follows with Dr Yordy Kelly Type 2 diabetes mellitus without complication ([facility] [address]     Type II or unspecified type diabetes mellitus without mention of complication, not stated as uncontrolled     Vitamin D deficiency     Vitreous hemorrhage, right eye ([facility] [address]      Past Surgical History:   Procedure Laterality Date    CLEFT PALATE REPAIR      EYE SURGERY Bilateral     cataract surgery     KNEE SURGERY Right     OTHER SURGICAL HISTORY Right 7/08/2015    pars planta virectomy with par pan retinal photocoagulation    ROTATOR CUFF REPAIR Right      Family History   Problem Relation Age of Onset    Diabetes Mother     Heart Disease Mother     Diabetes Father     Heart Disease Father      Social History     Tobacco Use    Smoking status: Former Smoker     Packs/day: 2.00     Years: 13.00     Pack years: 26.00     Types: Cigarettes     Quit date: 1/1/1984 nose WNL. Nasal mucosa is boggy oropharynx: No erythema or exudate. Posterior pharynx is watery postnasal drainage neck: Supple. Palpation reveals no adenopathy. No masses appreciated. No JVD. Carotids: no  bruits. Resp: Respirations are unlabored. Clear to auscultation. No rales, rhonchi or wheezes appreciated  over the lungs bilaterally. CV: Rate is regular. Rhythm is regular. No gallop or rubs. No heart murmur appreciated. Extremities: No clubbing, cyanosis, or edema. No calf inflammation or tenderness. Abdomen: Bowel sounds are normoactive. Abdomen is soft, nontender, and nondistended. No  abdominal masses. No palpable hepatosplenomegaly. Lymph: No palpable or visible regional lymphadenopathy. Musculoskeletal: no acute joint inflammation. Skin: Dry and warm with no rash. Skin normal to inspection and palpation overall. Neuro: Alert and oriented. Affect: appropriate. Upper Extremities: 5/5 bilaterally. Lower Extremities:  5/5 bilaterally. Sensation intact to light touch. Reflexes: DTR's are symmetric and 2+ bilaterally. .  Cranial Nerves: Cranial nerves grossly intact. Assessment and Plan:   Diagnosis Orders   1. Type 2 diabetes mellitus with complication (HCC)  CBC Auto Differential    Comprehensive Metabolic Panel    Hemoglobin A1C    Lipid Panel    Urinalysis    Microalbumin / Creatinine Urine Ratio   2. Mixed hyperlipidemia  CBC Auto Differential    CK    Lipid Panel   3. Hypertension, essential  CBC Auto Differential   4. Carcinoma in situ of prostate  CBC Auto Differential   5. Vitamin B deficiency  CBC Auto Differential    Vitamin B12 & Folate   6. Vitamin D deficiency, unspecified  CBC Auto Differential    Vitamin D 25 Hydroxy   7. Health maintenance examination  CBC Auto Differential   8. Anemia, unspecified type  CBC Auto Differential       No problem-specific Assessment & Plan notes found for this encounter. Hypercalcemia   Flucates, .   Declines further evaluation or treatment other than as noted. Notify me if changes  mind. Signs and symptoms to watch for discussed. Serious signs and symptoms  reviewed. ER if any. Has been somewhat labile. He is asymptomatic. He will follow  with Dr. Meron Muller. Potential seriousness reviewed. declines malignancy screening  or repeat bone density  History of vitamin D deficiency on supplementation as well. Monitor levels.     Type 2 diabetes mellitus with complication (HCC)  following with Dr. Meron Muller. Watch BS closely ambulatory. Parameters given. macro  and microvascular complications reviewed. Call if not in range. ER if dangerous  numbers. hyper and hypoglycemic precautions reviewed. con't per Dr Meron Muller. Discussed regular eye exams, daily foot examinations. on high  dose insulin. risk of hypoglycemia and tx reviewed. On quinapril, risks benefits  reviewed   hemoglobin A1c was quite elevated with steroids, it is coming down, continue lifestyle modification, defers to Dr. Meron Muller     Mixed hyperlipidemia  Overall doing well. Continue current therapy. lifestyle modification reviewed. risk of  hyperlipidemia reviewed tolerating therapy. Niaspan and fenofibrate was discontinued, Niaspan because of cost. Could consider  fenofibrate if triglycerides mandate , mildly elevated, HDL chronically low. goals reviewed. declines  change in tx.   Wants to continue current tx.  Proper hydration reviewed     Hypertension, essential  Watch ambulatory, parameters given. If out of range, notify. If dangerous numbers,  directly to ER. Risk of HTN reviewed. lifestyle modification emphasized. Risks of  hypertension and hypotension reviewed. Tolerating therapy. Tolerating Toprol. And quinapril.     Carcinoma in situ of prostate  Continue per [place]. Chasity .   I recommended he  follow-up with urology still with history of prostate cancer, history of seeds. No longer  seeing Dr. Sutherland Officer . Would like us to monitor PSA.    PSA 2/21 less than 0.01 defers exam     Anemia  chronic, stable, normal now. Counseled extensively. Differential reviewed, including  serious etiologies. Declines further evaluation/treatment. Had colonoscopy 4/14 dr Ronnie Barnes; small patch of telengectasia. recommended repeat 5-7 yrs . He is in no  hurry . He has been stable. Declines FIT or cscope before 2021. Currently normal     Vitamin B deficiency  Stable, monitor ; cont otc     Vitamin D deficiency, unspecified  stable, stable on D3 2000 IUs daily to 4000 IUs daily during winter months. Health maintenance examination  Health maintenance issues discussed at length 9/20. Flu vaccine today. Ferm[facility] screen neg 12/19       Plan as above. Counseled extensively and differential diagnoses relevant to above were reviewed, including serious etiologies. Side effects and interactions of medications were reviewed. Continue per specialists. Precautions reviewed. Continue current management. Simply wants blood work and follow-up for 4 months sooner as needed. As long as symptoms steadily improve/resolve, and medical conditions follow the expected course, FU as below, sooner PRN. Return in about 4 months (around 6/19/2021), or if symptoms worsen or fail to improve. Signs and symptoms to watch for discussed, serious signs and symptoms reviewed. ER if any. Mari De Guzman MD    Patients are advised to check with insurance company to ensure coverage and to fully understand benefits and cost prior to any testing. This note was created with the assistance of voice recognition software. Document was reviewed however may contain grammatical errors.

## 2021-06-14 ENCOUNTER — HOSPITAL ENCOUNTER (OUTPATIENT)
Age: 71
Discharge: HOME | End: 2021-06-14
Payer: MEDICARE

## 2021-06-14 DIAGNOSIS — D07.5 CARCINOMA IN SITU OF PROSTATE: ICD-10-CM

## 2021-06-14 DIAGNOSIS — E55.9 VITAMIN D DEFICIENCY, UNSPECIFIED: ICD-10-CM

## 2021-06-14 DIAGNOSIS — Z00.00 HEALTH MAINTENANCE EXAMINATION: ICD-10-CM

## 2021-06-14 DIAGNOSIS — E78.2 MIXED HYPERLIPIDEMIA: ICD-10-CM

## 2021-06-14 DIAGNOSIS — I10 HYPERTENSION, ESSENTIAL: ICD-10-CM

## 2021-06-14 DIAGNOSIS — E11.8 TYPE 2 DIABETES MELLITUS WITH COMPLICATION (HCC): ICD-10-CM

## 2021-06-14 DIAGNOSIS — E53.9 VITAMIN B DEFICIENCY: ICD-10-CM

## 2021-06-14 DIAGNOSIS — D64.9 ANEMIA, UNSPECIFIED TYPE: ICD-10-CM

## 2021-06-14 LAB
ALBUMIN SERPL-MCNC: 4.3 G/DL (ref 3.5–5.2)
ALP BLD-CCNC: 55 U/L (ref 40–129)
ALT SERPL-CCNC: 31 U/L (ref 0–40)
ANION GAP SERPL CALCULATED.3IONS-SCNC: 9 MMOL/L (ref 7–16)
AST SERPL-CCNC: 22 U/L (ref 0–39)
BASOPHILS ABSOLUTE: 0.08 E9/L (ref 0–0.2)
BASOPHILS RELATIVE PERCENT: 1.2 % (ref 0–2)
BILIRUB SERPL-MCNC: 0.5 MG/DL (ref 0–1.2)
BILIRUBIN URINE: NEGATIVE
BLOOD, URINE: NEGATIVE
BUN BLDV-MCNC: 20 MG/DL (ref 6–23)
CALCIUM SERPL-MCNC: 10.3 MG/DL (ref 8.6–10.2)
CHLORIDE BLD-SCNC: 106 MMOL/L (ref 98–107)
CHOLESTEROL, TOTAL: 111 MG/DL (ref 0–199)
CLARITY: CLEAR
CO2: 26 MMOL/L (ref 22–29)
COLOR: YELLOW
CREAT SERPL-MCNC: 0.9 MG/DL (ref 0.7–1.2)
CREATININE URINE: 143 MG/DL (ref 40–278)
EOSINOPHILS ABSOLUTE: 0.13 E9/L (ref 0.05–0.5)
EOSINOPHILS RELATIVE PERCENT: 1.9 % (ref 0–6)
FOLATE: 12 NG/ML (ref 4.8–24.2)
GFR AFRICAN AMERICAN: >60
GFR NON-AFRICAN AMERICAN: >60 ML/MIN/1.73
GLUCOSE BLD-MCNC: 110 MG/DL (ref 74–99)
GLUCOSE URINE: NEGATIVE MG/DL
HBA1C MFR BLD: 7.1 % (ref 4–5.6)
HCT VFR BLD CALC: 43.5 % (ref 37–54)
HDLC SERPL-MCNC: 20 MG/DL
HEMOGLOBIN: 14.4 G/DL (ref 12.5–16.5)
IMMATURE GRANULOCYTES #: 0.02 E9/L
IMMATURE GRANULOCYTES %: 0.3 % (ref 0–5)
KETONES, URINE: NEGATIVE MG/DL
LDL CHOLESTEROL CALCULATED: 60 MG/DL (ref 0–99)
LEUKOCYTE ESTERASE, URINE: NEGATIVE
LYMPHOCYTES ABSOLUTE: 1.79 E9/L (ref 1.5–4)
LYMPHOCYTES RELATIVE PERCENT: 26.2 % (ref 20–42)
MCH RBC QN AUTO: 29.8 PG (ref 26–35)
MCHC RBC AUTO-ENTMCNC: 33.1 % (ref 32–34.5)
MCV RBC AUTO: 89.9 FL (ref 80–99.9)
MICROALBUMIN UR-MCNC: <12 MG/L
MICROALBUMIN/CREAT UR-RTO: ABNORMAL (ref 0–30)
MONOCYTES ABSOLUTE: 0.59 E9/L (ref 0.1–0.95)
MONOCYTES RELATIVE PERCENT: 8.6 % (ref 2–12)
NEUTROPHILS ABSOLUTE: 4.23 E9/L (ref 1.8–7.3)
NEUTROPHILS RELATIVE PERCENT: 61.8 % (ref 43–80)
NITRITE, URINE: NEGATIVE
PDW BLD-RTO: 13.5 FL (ref 11.5–15)
PH UA: 5.5 (ref 5–9)
PLATELET # BLD: 224 E9/L (ref 130–450)
PMV BLD AUTO: 12.1 FL (ref 7–12)
POTASSIUM SERPL-SCNC: 5 MMOL/L (ref 3.5–5)
PROTEIN UA: NEGATIVE MG/DL
RBC # BLD: 4.84 E12/L (ref 3.8–5.8)
SODIUM BLD-SCNC: 141 MMOL/L (ref 132–146)
SPECIFIC GRAVITY UA: >=1.03 (ref 1–1.03)
TOTAL CK: 202 U/L (ref 20–200)
TOTAL PROTEIN: 7.5 G/DL (ref 6.4–8.3)
TRIGL SERPL-MCNC: 155 MG/DL (ref 0–149)
UROBILINOGEN, URINE: 0.2 E.U./DL
VITAMIN B-12: 697 PG/ML (ref 211–946)
VITAMIN D 25-HYDROXY: 30 NG/ML (ref 30–100)
VLDLC SERPL CALC-MCNC: 31 MG/DL
WBC # BLD: 6.8 E9/L (ref 4.5–11.5)

## 2021-06-14 PROCEDURE — 85025 COMPLETE CBC W/AUTO DIFF WBC: CPT

## 2021-06-14 PROCEDURE — 82550 ASSAY OF CK (CPK): CPT

## 2021-06-14 PROCEDURE — 81003 URINALYSIS AUTO W/O SCOPE: CPT

## 2021-06-14 PROCEDURE — 83036 HEMOGLOBIN GLYCOSYLATED A1C: CPT

## 2021-06-14 PROCEDURE — 82570 ASSAY OF URINE CREATININE: CPT

## 2021-06-14 PROCEDURE — 82746 ASSAY OF FOLIC ACID SERUM: CPT

## 2021-06-14 PROCEDURE — 80053 COMPREHEN METABOLIC PANEL: CPT

## 2021-06-14 PROCEDURE — 36415 COLL VENOUS BLD VENIPUNCTURE: CPT

## 2021-06-14 PROCEDURE — 82306 VITAMIN D 25 HYDROXY: CPT

## 2021-06-14 PROCEDURE — 82607 VITAMIN B-12: CPT

## 2021-06-14 PROCEDURE — 82044 UR ALBUMIN SEMIQUANTITATIVE: CPT

## 2021-06-14 PROCEDURE — 80061 LIPID PANEL: CPT

## 2021-06-18 ENCOUNTER — OFFICE VISIT (OUTPATIENT)
Dept: PRIMARY CARE CLINIC | Age: 71
End: 2021-06-18
Payer: MEDICARE

## 2021-06-18 VITALS
DIASTOLIC BLOOD PRESSURE: 64 MMHG | HEART RATE: 93 BPM | OXYGEN SATURATION: 95 % | TEMPERATURE: 96.9 F | SYSTOLIC BLOOD PRESSURE: 138 MMHG | WEIGHT: 228.6 LBS | BODY MASS INDEX: 33.76 KG/M2

## 2021-06-18 DIAGNOSIS — D07.5 CARCINOMA IN SITU OF PROSTATE: ICD-10-CM

## 2021-06-18 DIAGNOSIS — E66.09 CLASS 1 OBESITY DUE TO EXCESS CALORIES WITHOUT SERIOUS COMORBIDITY WITH BODY MASS INDEX (BMI) OF 34.0 TO 34.9 IN ADULT: ICD-10-CM

## 2021-06-18 DIAGNOSIS — E83.52 HYPERCALCEMIA: ICD-10-CM

## 2021-06-18 DIAGNOSIS — E11.8 TYPE 2 DIABETES MELLITUS WITH COMPLICATION (HCC): Primary | ICD-10-CM

## 2021-06-18 DIAGNOSIS — I10 HYPERTENSION, ESSENTIAL: ICD-10-CM

## 2021-06-18 DIAGNOSIS — E78.2 MIXED HYPERLIPIDEMIA: ICD-10-CM

## 2021-06-18 DIAGNOSIS — Z00.00 HEALTH MAINTENANCE EXAMINATION: ICD-10-CM

## 2021-06-18 DIAGNOSIS — E55.9 VITAMIN D DEFICIENCY, UNSPECIFIED: ICD-10-CM

## 2021-06-18 DIAGNOSIS — D64.9 ANEMIA, UNSPECIFIED TYPE: ICD-10-CM

## 2021-06-18 DIAGNOSIS — E53.9 VITAMIN B DEFICIENCY: ICD-10-CM

## 2021-06-18 PROCEDURE — 99214 OFFICE O/P EST MOD 30 MIN: CPT | Performed by: FAMILY MEDICINE

## 2021-06-18 PROCEDURE — 3051F HG A1C>EQUAL 7.0%<8.0%: CPT | Performed by: FAMILY MEDICINE

## 2021-06-18 SDOH — ECONOMIC STABILITY: FOOD INSECURITY: WITHIN THE PAST 12 MONTHS, YOU WORRIED THAT YOUR FOOD WOULD RUN OUT BEFORE YOU GOT MONEY TO BUY MORE.: NEVER TRUE

## 2021-06-18 SDOH — ECONOMIC STABILITY: FOOD INSECURITY: WITHIN THE PAST 12 MONTHS, THE FOOD YOU BOUGHT JUST DIDN'T LAST AND YOU DIDN'T HAVE MONEY TO GET MORE.: NEVER TRUE

## 2021-06-18 ASSESSMENT — SOCIAL DETERMINANTS OF HEALTH (SDOH): HOW HARD IS IT FOR YOU TO PAY FOR THE VERY BASICS LIKE FOOD, HOUSING, MEDICAL CARE, AND HEATING?: NOT HARD AT ALL

## 2021-06-18 NOTE — PROGRESS NOTES
19  Glynn Matters : 1950 Sex: male  Age: 79 y.o. Chief Complaint   Patient presents with    Diabetes    Hyperlipidemia       HPI:    OVerall feels well. A1C coming down, 6.8 the other day finger stick, 7.1 lab. No other acute complaints or concerns.   Following with endocrinology      Blood work reviewed, hemoglobin A1c 7.1Glucose 110 calcium elevated but stable at 10.3 CMP otherwise normal HDL 20 LDL 60 triglyceride 155 vitamin D 30 CBC grossly normal differential reviewed R69 folic acid normal urinalysis overall negative microalbumin creatinine ratio-NC  Most Recent Labs  CBC  Lab Results   Component Value Date    WBC 6.8 2021    WBC 6.2 2021    WBC 6.2 10/14/2020    RBC 4.84 2021    RBC 4.94 2021    RBC 4.58 10/14/2020    HGB 14.4 2021    HGB 14.8 2021    HGB 14.1 10/14/2020    HCT 43.5 2021    HCT 44.8 2021    HCT 43.0 10/14/2020    MCV 89.9 2021    MCV 90.7 2021    MCV 93.9 10/14/2020     2021     2021     10/14/2020      CMP  Lab Results   Component Value Date     2021     2021     10/14/2020    K 5.0 2021    K 4.3 2021    K 5.0 10/14/2020     2021     2021     10/14/2020    CO2 26 2021    CO2 31 2021    CO2 27 10/14/2020    ANIONGAP 9 2021    ANIONGAP 10 10/14/2020    GLUCOSE 110 2021    GLUCOSE 117 2021    GLUCOSE 135 10/14/2020    BUN 20 2021    BUN 17 2021    BUN 16 10/14/2020    CREATININE 0.9 2021    CREATININE 0.88 2021    CREATININE 0.9 10/14/2020    LABGLOM >60 2021    LABGLOM 87 2021    LABGLOM >60 10/14/2020    GFRAA >60 2021    GFRAA >60 10/14/2020    CALCIUM 10.3 2021    CALCIUM 9.8 2021    CALCIUM 10.5 10/14/2020    PROT 7.5 2021    PROT 7.4 10/14/2020    LABALBU 4.3 2021    LABALBU 4.3 2021    LABALBU 4.2 10/14/2020    BILITOT 0.5 06/14/2021    BILITOT 0.6 02/13/2021    BILITOT 0.5 10/14/2020    ALKPHOS 55 06/14/2021    ALKPHOS 45 02/13/2021    ALKPHOS 44 10/14/2020    AST 22 06/14/2021    AST 16 02/13/2021    AST 21 10/14/2020    ALT 31 06/14/2021    ALT 26 02/13/2021    ALT 38 10/14/2020     A1C  Lab Results   Component Value Date    LABA1C 7.1 06/14/2021    LABA1C 7.4 02/13/2021    LABA1C 8.0 10/14/2020     TSH  Lab Results   Component Value Date    TSH 2.04 02/13/2021    TSH 2.510 10/14/2020    TSH 2.03 05/29/2020     FREET4  No results found for: [id number]  LIPID  Lab Results   Component Value Date    CHOL 111 06/14/2021    CHOL 102 02/13/2021    CHOL 117 10/14/2020    HDL 20 06/14/2021    HDL 23 02/13/2021    HDL 23 10/14/2020    LDLCALC 60 06/14/2021    LDLCALC 58 02/13/2021    LDLCALC 61 10/14/2020    TRIG 155 06/14/2021    TRIG 124 02/13/2021    TRIG 163 10/14/2020    CHOLHDLRATIO 4.4 02/13/2021    CHOLHDLRATIO 4.6 05/29/2020    CHOLHDLRATIO 5.7 11/23/2019     VITAMIN D  Lab Results   Component Value Date    VITD25 30 06/14/2021    VITD25 31 02/13/2021    VITD25 25 10/14/2020     MAGNESIUM  No results found for: MG   PHOS  No results found for: PHOS   MORALES   No results found for: MORALES  RHEUMATOID FACTOR  No results found for: RF  PSA  Lab Results   Component Value Date    PSA  02/13/2021      Comment:      <0.1    PSA <0.03 10/14/2020    PSA 0.1 05/29/2020      HEPATITIS C  Lab Results   Component Value Date    HCVABI Non-Reactive 10/14/2020     HIV  No results found for: FGN1WVD, HIV1QT  UA  Lab Results   Component Value Date    COLORU Yellow 06/14/2021    COLORU Yellow 02/13/2021    COLORU Yellow 05/29/2020    CLARITYU Clear 06/14/2021    CLARITYU Clear 02/13/2021    CLARITYU Clear 05/29/2020    GLUCOSEU Negative 06/14/2021    GLUCOSEU 1+ 02/13/2021    GLUCOSEU negative 05/29/2020    BILIRUBINUR Negative 06/14/2021    BILIRUBINUR Negative 02/13/2021    BILIRUBINUR Negative 05/29/2020    BILIRUBINUR Negative 11/23/2019    KETUA Negative 06/14/2021    KETUA Negative 02/13/2021    KETUA Negative 05/29/2020    SPECGRAV >=1.030 06/14/2021    BLOODU Negative 06/14/2021    BLOODU Negative 02/13/2021    BLOODU Negative 05/29/2020    PHUR 5.5 06/14/2021    PHUR 5.5 02/13/2021    PHUR  11/23/2019      Comment:      < or = 5.0    PROTEINU Negative 06/14/2021    PROTEINU Negative 02/13/2021    PROTEINU Negative 05/29/2020    UROBILINOGEN 0.2 06/14/2021    UROBILINOGEN Normal 11/23/2019    NITRU Negative 06/14/2021    NITRU Negative 02/13/2021    NITRU Negative 05/29/2020    LEUKOCYTESUR Negative 06/14/2021    LEUKOCYTESUR Negative 02/13/2021    LEUKOCYTESUR Negative 05/29/2020     Urine Micro/Albumin Ratio  Lab Results   Component Value Date    MALBCR - 06/14/2021    MALBCR 23 02/13/2021    MALBCR 7 05/29/2020             ROS:  Const: Denies chills, fever, malaise and sweats. Eyes: Chronic Eye sxs  ENMT: Denies earaches, other ear symptoms other than chronic hearing loss and right-sided tinnitus. . Denies nasal or sinus symptoms other than stated  above. Denies mouth and tongue lesions and sore throat. CV: Denies chest discomfort, pain; diaphoresis, dizziness, edema, lightheadedness, orthopnea,  palpitations, syncope and near syncopal episode or any exertional symptoms  Resp: Denies cough, hemoptysis, pleuritic pain, SOB, sputum production and wheezing. GI: Denies abdominal pain, change in bowel habits, hematochezia, melena, nausea and vomiting. : Denies urinary symptoms including dysuria , urgency, frequency or hematuria. Musculo: No acute symptoms  Skin: Denies bruising and rash.   Neuro: Denies headache, numbness, stiff neck, tingling and focal weakness slurred speech or facial  droop  Hema/Lymph: Denies bleeding/bruising tendency and enlarged lymph nodes        Current Outpatient Medications:     atorvastatin (LIPITOR) 40 MG tablet, Take 1 tablet by mouth daily, Disp: 90 tablet, Rfl: 3    metFORMIN (GLUCOPHAGE) 1000 MG ROTATOR CUFF REPAIR Right      Family History   Problem Relation Age of Onset    Diabetes Mother     Heart Disease Mother     Diabetes Father     Heart Disease Father      Social History     Tobacco Use    Smoking status: Former Smoker     Packs/day: 2.00     Years: 13.00     Pack years: 26.00     Types: Cigarettes     Quit date: 1984     Years since quittin.4    Smokeless tobacco: Never Used   Vaping Use    Vaping Use: Never used   Substance Use Topics    Alcohol use: No    Drug use: No      Social History     Social History Narrative    PMH:    Past Medical History: diabetes mellitus type II insulin requiring, hyperlipidemia, hypertension, Peyronie's    disease, prostate cancer, hypercalcemia, vitreous hemmorage B/L            Family Medical History: Dad  of diabetes complications at age 67. He had two myocardial infarction,    first at age 58 and 4-5 cerebrovascular accidents afterwards. Mom did not have coronary artery disease    that he is aware of but did have diabetes and  at the age of 80 of diabetic complications. Sister     at age 62 of diabetic complications, \"did not take care of herself\", had significant obesity, renal failure and    was on dialysis. He states mom had peripheral vascular disease and gangrene which lead to renal    failure and a cascade of events. Surgical Hx:    Rotator Cuff - RT Early     B/L Cataract    Vitreous Hemmorage B/L - SCAR TISSUE LEFT - BLIND    RIGHT EYE DOING OK. MULTIPLE SURGERIES ON BOTH    Left Shoulder - Rotator Cuff Dr Marie Mcpherson     SH: Marital: , With 2 Children, Legal Status: . Personal Habits: Cigarette Use: Former Cigarette Smoker - Quit . Alcohol: Occasionally    consumes alcohol. Exercise Type: Walks 4 times a week, Employed Through [employer] -    Retired. .    Reviewed and updated.         Vitals:    21 1005   BP: 138/64   Site: Right Upper Arm   Position: Sitting   Pulse: 93 Temp: 96.9 °F (36.1 °C)   TempSrc: Temporal   SpO2: 95%   Weight: 228 lb 9.6 oz (103.7 kg)      Wt Readings from Last 3 Encounters:   06/18/21 228 lb 9.6 oz (103.7 kg)   02/19/21 227 lb (103 kg)   10/19/20 225 lb (102.1 kg)          Exam:  Const: Appears comfortable. No signs of acute distress present. Head/Face: Atraumatic on inspection. Eyes: EOMI in both eyes. PERRL. No injection  ENMT: Auditory canals normal. Tympanic membranes: intact and translucent. External nose WNL. Nasal mucosa is boggy oropharynx: No erythema or exudate. Posterior pharynx is watery postnasal drainage  Neck: Supple. Palpation reveals no adenopathy. No masses appreciated. No JVD. Carotids: no  bruits. Resp: Respirations are unlabored. Clear to auscultation. No rales, rhonchi or wheezes appreciated  over the lungs bilaterally. CV: Rate is regular. Rhythm is regular. No gallop or rubs. No heart murmur appreciated. Extremities: No clubbing, cyanosis, or edema. No calf inflammation or tenderness. Abdomen: Bowel sounds are normoactive. Abdomen is soft, nontender, and nondistended. No  abdominal masses. No palpable hepatosplenomegaly. Lymph: No palpable or visible regional lymphadenopathy. Musculoskeletal: no acute joint inflammation. Skin: Dry and warm with no rash. Skin normal to inspection and palpation overall. Neuro: Alert and oriented. Affect: appropriate. Upper Extremities: 5/5 bilaterally. Lower Extremities:  5/5 bilaterally. Sensation intact to light touch. Reflexes: DTR's are symmetric and 2+ bilaterally. .  Cranial Nerves: Cranial nerves grossly intact. Assessment and Plan:   Diagnosis Orders   1. Type 2 diabetes mellitus with complication (HCC)  TSH without Reflex    Comprehensive Metabolic Panel    CBC Auto Differential    Urinalysis    Microalbumin / Creatinine Urine Ratio    Hemoglobin A1C   2. Mixed hyperlipidemia  Lipid Panel    TSH without Reflex    Comprehensive Metabolic Panel    CBC Auto Differential   3.

## 2021-09-16 ENCOUNTER — OFFICE VISIT (OUTPATIENT)
Dept: PRIMARY CARE CLINIC | Age: 71
End: 2021-09-16
Payer: MEDICARE

## 2021-09-16 VITALS
WEIGHT: 231 LBS | SYSTOLIC BLOOD PRESSURE: 128 MMHG | DIASTOLIC BLOOD PRESSURE: 62 MMHG | OXYGEN SATURATION: 93 % | HEART RATE: 78 BPM | BODY MASS INDEX: 34.11 KG/M2 | TEMPERATURE: 97.8 F

## 2021-09-16 DIAGNOSIS — E78.2 MIXED HYPERLIPIDEMIA: Primary | ICD-10-CM

## 2021-09-16 DIAGNOSIS — Z00.00 HEALTH MAINTENANCE EXAMINATION: ICD-10-CM

## 2021-09-16 DIAGNOSIS — E55.9 VITAMIN D DEFICIENCY, UNSPECIFIED: ICD-10-CM

## 2021-09-16 DIAGNOSIS — E66.09 CLASS 1 OBESITY DUE TO EXCESS CALORIES WITHOUT SERIOUS COMORBIDITY WITH BODY MASS INDEX (BMI) OF 34.0 TO 34.9 IN ADULT: ICD-10-CM

## 2021-09-16 DIAGNOSIS — Z23 ENCOUNTER FOR IMMUNIZATION: ICD-10-CM

## 2021-09-16 DIAGNOSIS — E83.52 HYPERCALCEMIA: ICD-10-CM

## 2021-09-16 DIAGNOSIS — Z79.4 TYPE 2 DIABETES MELLITUS WITH DIABETIC POLYNEUROPATHY, WITH LONG-TERM CURRENT USE OF INSULIN (HCC): ICD-10-CM

## 2021-09-16 DIAGNOSIS — I10 HYPERTENSION, ESSENTIAL: ICD-10-CM

## 2021-09-16 DIAGNOSIS — E53.9 VITAMIN B DEFICIENCY: ICD-10-CM

## 2021-09-16 DIAGNOSIS — D07.5 CARCINOMA IN SITU OF PROSTATE: ICD-10-CM

## 2021-09-16 DIAGNOSIS — E11.42 TYPE 2 DIABETES MELLITUS WITH DIABETIC POLYNEUROPATHY, WITH LONG-TERM CURRENT USE OF INSULIN (HCC): ICD-10-CM

## 2021-09-16 PROCEDURE — 99215 OFFICE O/P EST HI 40 MIN: CPT | Performed by: FAMILY MEDICINE

## 2021-09-16 PROCEDURE — G0008 ADMIN INFLUENZA VIRUS VAC: HCPCS | Performed by: FAMILY MEDICINE

## 2021-09-16 PROCEDURE — 3051F HG A1C>EQUAL 7.0%<8.0%: CPT | Performed by: FAMILY MEDICINE

## 2021-09-16 PROCEDURE — 90694 VACC AIIV4 NO PRSRV 0.5ML IM: CPT | Performed by: FAMILY MEDICINE

## 2021-09-16 RX ORDER — METOPROLOL SUCCINATE 25 MG/1
25 TABLET, EXTENDED RELEASE ORAL DAILY
Qty: 90 TABLET | Refills: 3 | Status: SHIPPED
Start: 2021-09-16 | End: 2022-10-14 | Stop reason: SDUPTHER

## 2021-09-16 RX ORDER — QUINAPRIL 20 MG/1
20 TABLET ORAL DAILY
Qty: 90 TABLET | Refills: 3 | Status: SHIPPED
Start: 2021-09-16 | End: 2022-10-14 | Stop reason: SDUPTHER

## 2021-09-16 RX ORDER — ATORVASTATIN CALCIUM 40 MG/1
40 TABLET, FILM COATED ORAL DAILY
Qty: 90 TABLET | Refills: 3 | Status: SHIPPED
Start: 2021-09-16 | End: 2022-10-10 | Stop reason: SDUPTHER

## 2021-09-16 ASSESSMENT — PATIENT HEALTH QUESTIONNAIRE - PHQ9
SUM OF ALL RESPONSES TO PHQ QUESTIONS 1-9: 0
2. FEELING DOWN, DEPRESSED OR HOPELESS: 0
1. LITTLE INTEREST OR PLEASURE IN DOING THINGS: 0
SUM OF ALL RESPONSES TO PHQ9 QUESTIONS 1 & 2: 0

## 2021-09-16 NOTE — PROGRESS NOTES
19  Reji Alonso : 1950 Sex: male  Age: 70 y.o. Chief Complaint   Patient presents with    3 Month Follow-Up    Discuss Labs       HPI:    OVerall feels well. He does have questions regarding various supplements and things which were addressed with him. He also has questions regarding chronic neuropathy, friends take gabapentin, counseled extensively, he wants to hold off now.   Otherwise feeling well    Blood work reviewed,    Ionized calcium elevated but relatively stable at 1.44 total calcium 10.8 HDL chronically low 22 LDL 48 triglyceride 145 hemoglobin A1c 7.3, goals reviewed, PTH normal at 26 TSH 1.6 vitamin D 33 CBC grossly normal differential reviewed vitamin B12 elevated at 9298 counseled folic acid 89.1 PSA not detected, did not provide urine this time, ordered      Most Recent Labs  CBC  Lab Results   Component Value Date    WBC 7.5 09/10/2021    WBC 6.8 2021    WBC 6.2 2021    RBC 4.95 09/10/2021    RBC 4.84 2021    RBC 4.94 2021    HGB 14.5 09/10/2021    HGB 14.4 2021    HGB 14.8 2021    HCT 44.5 09/10/2021    HCT 43.5 2021    HCT 44.8 2021    MCV 89.9 09/10/2021    MCV 89.9 2021    MCV 90.7 2021     09/10/2021     2021     2021      CMP  Lab Results   Component Value Date     09/10/2021     2021     2021    K 5.0 09/10/2021    K 5.0 2021    K 4.3 2021     09/10/2021     2021     2021    CO2 23 09/10/2021    CO2 26 2021    CO2 31 2021    ANIONGAP 17 09/10/2021    ANIONGAP 9 2021    ANIONGAP 10 10/14/2020    GLUCOSE 79 09/10/2021    GLUCOSE 110 2021    GLUCOSE 117 2021    BUN 14 09/10/2021    BUN 20 2021    BUN 17 2021    CREATININE 0.8 09/10/2021    CREATININE 0.9 2021    CREATININE 0.88 2021    LABGLOM >60 09/10/2021    LABGLOM >60 2021    LABGLOM 87 2021 LABGLOM >60 10/14/2020    GFRAA >60 09/10/2021    GFRAA >60 06/14/2021    GFRAA >60 10/14/2020    CALCIUM 10.8 09/10/2021    CALCIUM 10.3 06/14/2021    CALCIUM 9.8 02/13/2021    PROT 7.9 09/10/2021    PROT 7.5 06/14/2021    PROT 7.4 10/14/2020    LABALBU 4.6 09/10/2021    LABALBU 4.3 06/14/2021    LABALBU 4.3 02/13/2021    BILITOT 0.5 09/10/2021    BILITOT 0.5 06/14/2021    BILITOT 0.6 02/13/2021    ALKPHOS 56 09/10/2021    ALKPHOS 55 06/14/2021    ALKPHOS 45 02/13/2021    AST 23 09/10/2021    AST 22 06/14/2021    AST 16 02/13/2021    ALT 28 09/10/2021    ALT 31 06/14/2021    ALT 26 02/13/2021     A1C  Lab Results   Component Value Date    LABA1C 7.3 09/10/2021    LABA1C 7.1 06/14/2021    LABA1C 7.4 02/13/2021     TSH  Lab Results   Component Value Date    TSH 1.690 09/10/2021    TSH 2.04 02/13/2021    TSH 2.510 10/14/2020     FREET4  No results found for: [id number]  LIPID  Lab Results   Component Value Date    CHOL 99 09/10/2021    CHOL 111 06/14/2021    CHOL 102 02/13/2021    HDL 22 09/10/2021    HDL 20 06/14/2021    HDL 23 02/13/2021    LDLCALC 48 09/10/2021    LDLCALC 60 06/14/2021    LDLCALC 58 02/13/2021    TRIG 145 09/10/2021    TRIG 155 06/14/2021    TRIG 124 02/13/2021    CHOLHDLRATIO 4.4 02/13/2021    CHOLHDLRATIO 4.6 05/29/2020    CHOLHDLRATIO 5.7 11/23/2019     VITAMIN D  Lab Results   Component Value Date    VITD25 33 09/10/2021    VITD25 30 06/14/2021    VITD25 31 02/13/2021     MAGNESIUM  No results found for: MG   PHOS  No results found for: PHOS   MORALES   No results found for: MORALES  RHEUMATOID FACTOR  No results found for: RF  PSA  Lab Results   Component Value Date    PSA <0.03 09/10/2021    PSA  02/13/2021      Comment:      <0.1    PSA <0.03 10/14/2020      HEPATITIS C  Lab Results   Component Value Date    HCVABI Non-Reactive 10/14/2020     HIV  No results found for: ETM4DEI, HIV1QT  UA  Lab Results   Component Value Date    COLORU Yellow 06/14/2021    COLORU Yellow 02/13/2021    COLORU Yellow 05/29/2020    CLARITYU Clear 06/14/2021    CLARITYU Clear 02/13/2021    CLARITYU Clear 05/29/2020    GLUCOSEU Negative 06/14/2021    GLUCOSEU 1+ 02/13/2021    GLUCOSEU negative 05/29/2020    BILIRUBINUR Negative 06/14/2021    BILIRUBINUR Negative 02/13/2021    BILIRUBINUR Negative 05/29/2020    BILIRUBINUR Negative 11/23/2019    KETUA Negative 06/14/2021    KETUA Negative 02/13/2021    KETUA Negative 05/29/2020    SPECGRAV >=1.030 06/14/2021    BLOODU Negative 06/14/2021    BLOODU Negative 02/13/2021    BLOODU Negative 05/29/2020    PHUR 5.5 06/14/2021    PHUR 5.5 02/13/2021    PHUR  11/23/2019      Comment:      < or = 5.0    PROTEINU Negative 06/14/2021    PROTEINU Negative 02/13/2021    PROTEINU Negative 05/29/2020    UROBILINOGEN 0.2 06/14/2021    UROBILINOGEN Normal 11/23/2019    NITRU Negative 06/14/2021    NITRU Negative 02/13/2021    NITRU Negative 05/29/2020    LEUKOCYTESUR Negative 06/14/2021    LEUKOCYTESUR Negative 02/13/2021    LEUKOCYTESUR Negative 05/29/2020     Urine Micro/Albumin Ratio  Lab Results   Component Value Date    MALBCR - 06/14/2021    MALBCR 23 02/13/2021    MALBCR 7 05/29/2020             ROS:  Const: Denies chills, fever, malaise and sweats. Eyes: Chronic Eye sxs  ENMT: Denies earaches, other ear symptoms other than chronic hearing loss and right-sided tinnitus. . Denies nasal or sinus symptoms other than stated  above. Denies mouth and tongue lesions and sore throat. CV: Denies chest discomfort, pain; diaphoresis, dizziness, edema, lightheadedness, orthopnea,  palpitations, syncope and near syncopal episode or any exertional symptoms  Resp: Denies cough, hemoptysis, pleuritic pain, SOB, sputum production and wheezing. GI: Denies abdominal pain, change in bowel habits, hematochezia, melena, nausea and vomiting. : Denies urinary symptoms including dysuria , urgency, frequency or hematuria. Musculo: No acute symptoms  Skin: Denies bruising and rash.   Neuro: Denies headache, numbness, stiff neck, tingling and focal weakness slurred speech or facial  droop, chronic neuropathy  Hema/Lymph: Denies bleeding/bruising tendency and enlarged lymph nodes        Current Outpatient Medications:     atorvastatin (LIPITOR) 40 MG tablet, Take 1 tablet by mouth daily, Disp: 90 tablet, Rfl: 3    metFORMIN (GLUCOPHAGE) 1000 MG tablet, Take 1 tablet by mouth 2 times daily (with meals), Disp: 180 tablet, Rfl: 3    metoprolol succinate (TOPROL XL) 25 MG extended release tablet, Take 1 tablet by mouth daily, Disp: 90 tablet, Rfl: 3    quinapril (ACCUPRIL) 20 MG tablet, Take 1 tablet by mouth daily, Disp: 90 tablet, Rfl: 3    triamcinolone (KENALOG) 0.1 % cream, Apply topically 2 times daily for no more than 2 weeks, Disp: 15 g, Rfl: 1    diclofenac sodium (VOLTAREN) 1 % GEL, 4g qid prn lower ext joints (knees,ankles,feet) max 16g/d. (spine,hip,shoulder use has not been evaluated), Disp: 1 Tube, Rfl: 3    fenofibrate (TRICOR) 54 MG tablet,  Take 54 mg by mouth daily , Disp: , Rfl:     HUMALOG MIX 75/25 KWIKPEN (75-25) 100 UNIT/ML SUPN injection pen, 106 Units 2 times daily (with meals) , Disp: , Rfl:     aspirin 81 MG tablet,  Take 81 mg by mouth daily Prescribed per Dr Anahi Krishnan. Contact Dr Alex Ferguson preop instructions. , Disp: , Rfl:     vitamin B-12 (CYANOCOBALAMIN) 100 MCG tablet, Take 1,000 mcg by mouth daily, Disp: , Rfl:     vitamin D (CHOLECALCIFEROL) 1000 UNITS TABS tablet, Take 2,000 Units by mouth daily, Disp: , Rfl:   No Known Allergies    Past Medical History:   Diagnosis Date    Anemia     Bleeding of eye     behind eye going to have surgery 7/2015    Hyperlipidemia     Hypertension     Myalgia     Prostate cancer [facility])     Prostate enlargement     follows Dr Rodrick Mcintosh heart beat 03/2015    follows with Dr Ronan Christianson Type 2 diabetes mellitus without complication ([facility] [address]     Type II or unspecified type diabetes mellitus without mention of complication, not stated as uncontrolled     Vitamin D deficiency     Vitreous hemorrhage, right eye ([address]      Past Surgical History:   Procedure Laterality Date    CLEFT PALATE REPAIR      EYE SURGERY Bilateral     cataract surgery     KNEE SURGERY Right     OTHER SURGICAL HISTORY Right 2015    pars planta virectomy with par pan retinal photocoagulation    ROTATOR CUFF REPAIR Right      Family History   Problem Relation Age of Onset    Diabetes Mother     Heart Disease Mother     Diabetes Father     Heart Disease Father      Social History     Tobacco Use    Smoking status: Former Smoker     Packs/day: 2.00     Years: 13.00     Pack years: 26.00     Types: Cigarettes     Quit date: 1984     Years since quittin.7    Smokeless tobacco: Never Used   Vaping Use    Vaping Use: Never used   Substance Use Topics    Alcohol use: No    Drug use: No      Social History     Social History Narrative    PMH:    Past Medical History: diabetes mellitus type II insulin requiring, hyperlipidemia, hypertension, Peyronie's    disease, prostate cancer, hypercalcemia, vitreous hemmorage B/L            Family Medical History: Dad  of diabetes complications at age 67. He had two myocardial infarction,    first at age 58 and 4-5 cerebrovascular accidents afterwards. Mom did not have coronary artery disease    that he is aware of but did have diabetes and  at the age of 80 of diabetic complications. Sister     at age 62 of diabetic complications, \"did not take care of herself\", had significant obesity, renal failure and    was on dialysis. He states mom had peripheral vascular disease and gangrene which lead to renal    failure and a cascade of events. Surgical Hx:    Rotator Cuff - RT Early     B/L Cataract    Vitreous Hemmorage B/L - SCAR TISSUE LEFT - BLIND    RIGHT EYE DOING OK. MULTIPLE SURGERIES ON BOTH    Left Shoulder - Rotator Cuff Dr Kathy Quintanilla     SH:     Marital: , With 2 Children, Legal Status: . Personal Habits: Cigarette Use: Former Cigarette Smoker - Quit 1984. Alcohol: Occasionally    consumes alcohol. Exercise Type: Walks 4 times a week, Employed Through [employer] -    Retired. .    Reviewed and updated. Vitals:    09/16/21 0954   BP: 128/62   Pulse: 78   Temp: 97.8 °F (36.6 °C)   SpO2: 93%   Weight: 231 lb (104.8 kg)      Wt Readings from Last 3 Encounters:   09/16/21 231 lb (104.8 kg)   06/18/21 228 lb 9.6 oz (103.7 kg)   02/19/21 227 lb (103 kg)          Exam:  Const: Appears comfortable. No signs of acute distress present. Head/Face: Atraumatic on inspection. Eyes: EOMI in both eyes. PERRL. No injection  ENMT: Auditory canals normal. Tympanic membranes: intact and translucent. External nose WNL. Nasal mucosa is boggy oropharynx: No erythema or exudate. Posterior pharynx is watery postnasal drainage  Neck: Supple. Palpation reveals no adenopathy. No masses appreciated. No JVD. Carotids: no  bruits. Resp: Respirations are unlabored. Clear to auscultation. No rales, rhonchi or wheezes appreciated  over the lungs bilaterally. CV: Rate is regular. Rhythm is regular. No gallop or rubs. No heart murmur appreciated. Extremities: No clubbing, cyanosis, or edema. No calf inflammation or tenderness. Abdomen: Bowel sounds are normoactive. Abdomen is soft, nontender, and nondistended. No  abdominal masses. No palpable hepatosplenomegaly. Lymph: No palpable or visible regional lymphadenopathy. Musculoskeletal: no acute joint inflammation. Skin: Dry and warm with no rash. Skin normal to inspection and palpation overall. Neuro: Alert and oriented. Affect: appropriate. Upper Extremities: 5/5 bilaterally. Lower Extremities:  5/5 bilaterally. Sensation grossly intact, dull to light touch reflexes: DTR's are symmetric and 2+ bilaterally. .  Cranial Nerves: Cranial nerves grossly intact. Assessment and Plan:   Diagnosis Orders   1.  Mixed hyperlipidemia  atorvastatin (LIPITOR) 40 MG tablet    CBC Auto Differential    CK    Comprehensive Metabolic Panel    Lipid Panel    TSH without Reflex   2. Type 2 diabetes mellitus with diabetic polyneuropathy, with long-term current use of insulin (HCC)  metFORMIN (GLUCOPHAGE) 1000 MG tablet    quinapril (ACCUPRIL) 20 MG tablet    CBC Auto Differential    Comprehensive Metabolic Panel    Hemoglobin A1C    TSH without Reflex    Urinalysis    Microalbumin / Creatinine Urine Ratio   3. Hypertension, essential  metoprolol succinate (TOPROL XL) 25 MG extended release tablet    quinapril (ACCUPRIL) 20 MG tablet    CBC Auto Differential    TSH without Reflex   4. Health maintenance examination  INFLUENZA, QUADV, ADJUVANTED, 65 YRS =, IM, PF, PREFILL SYR, 0.5ML (FLUAD)    CBC Auto Differential    TSH without Reflex   5. Carcinoma in situ of prostate  CBC Auto Differential    TSH without Reflex   6. Vitamin D deficiency, unspecified  CBC Auto Differential    TSH without Reflex    Vitamin D 25 Hydroxy   7. Vitamin B deficiency  CBC Auto Differential    TSH without Reflex    Vitamin B12 & Folate   8. Hypercalcemia  Calcium, Ionized    CBC Auto Differential    TSH without Reflex    PTH, Intact   9. Encounter for immunization   INFLUENZA, QUADV, ADJUVANTED, 65 YRS =, IM, PF, PREFILL SYR, 0.5ML (FLUAD)   10. Class 1 obesity due to excess calories without serious comorbidity with body mass index (BMI) of 34.0 to 34.9 in adult         No problem-specific Assessment & Plan notes found for this encounter. Hypercalcemia   Flucates, . Declines further evaluation or treatment other than as noted. Notify me if changes  mind. Signs and symptoms to watch for discussed. Serious signs and symptoms  reviewed. ER if any. Has been somewhat labile. He is asymptomatic. He will follow  with Dr. Robert Real. Potential seriousness reviewed. Defers additional screening  or repeat bone density  History of vitamin D deficiency on supplementation as well. Monitor levels.    Neuropathy  Counseled extensively. Differential reviewed, including serious etiologies. Consider gabapentin but declines now. He will think about it. Defers EMG nerve conduction study. Type 2 diabetes mellitus with complication (HCC)  following with Dr. Aminata Menchaca. Watch BS closely ambulatory. Parameters given. macro  and microvascular complications reviewed. Call if not in range. ER if dangerous  numbers. hyper and hypoglycemic precautions reviewed. con't per Dr Aminata Menchaca. Discussed regular eye exams, daily foot examinations. on high  dose insulin. risk of hypoglycemia and tx reviewed. On quinapril, risks benefits  reviewed   hemoglobin A1c trending down 7.1-7.3, continue lifestyle modification, defers to Dr. Aminata Menchaca     Mixed hyperlipidemia  Overall doing well. Continue current therapy. lifestyle modification reviewed. risk of  hyperlipidemia reviewed tolerating therapy. Niaspan and fenofibrate was discontinued, Niaspan because of cost. Could consider  fenofibrate if triglycerides mandate , mildly elevated, HDL chronically low. goals reviewed. Would like to continue current tx.         Hypertension, essential  Watch ambulatory, parameters given. If out of range, notify. If dangerous numbers,  directly to ER. Risk of HTN reviewed. lifestyle modification emphasized. Risks of  hypertension and hypotension reviewed. Tolerating therapy. Tolerating Toprol. And quinapril.     Carcinoma in situ of prostate  Continue per specialists. , Chasity .   I recommended he  follow-up with urology still with history of prostate cancer, history of seeds. No longer  seeing Dr. Ga Jarvis . Would like us to monitor PSA. PSA 9/21 is less than 0.03 check PSA next visit     Anemia  chronic, stable, normal now. Counseled extensively. Differential reviewed, including  serious etiologies. Declines further evaluation/treatment. Had colonoscopy 4/14 dr Dashawn Andrade; small patch of telengectasia. recommended repeat 5-7 yrs . He is in no  hurry . He has been stable. Defers FIT or cscope before physical 2021. Currently normal     Vitamin B deficiency  Stable/high. Appropriate supplementation reviewed, monitor ; cont otc     Vitamin D deficiency, unspecified  stable, stable on D3 2000 IUs daily to 4000 IUs daily during winter months. Health maintenance examination  Health maintenance issues discussed at length 9/20. Schedule next visit for this AAA screen neg 12/19         OBesity  Counseled, risk reviewed, defers formal invention. Lifestyle education reviewed        Plan as above. Counseled extensively and differential diagnoses relevant to above were reviewed, including serious etiologies. Side effects and interactions of medications were reviewed. Continue per specialists. Precautions reviewed. Continue current management. Simply wants blood work and follow-up 3 months, plan physical then sooner as needed. As long as symptoms steadily improve/resolve, and medical conditions follow the expected course, FU as below, sooner PRN. Return in about 3 months (around 12/16/2021), or if symptoms worsen or fail to improve, for physical.     Over 40 minutes  spent with the patient in reviewing records, reviewing with patient/family, counseling, ordering,  prescribing, completing h&p, etc., with over 50% of the time spent face to face counseling. Signs and symptoms to watch for discussed, serious signs and symptoms reviewed. ER if any. Liborio Ramirez MD    Patients are advised to check with insurance company to ensure coverage and to fully understand benefits and cost prior to any testing. This note was created with the assistance of voice recognition software. Document was reviewed however may contain grammatical errors.

## 2021-09-16 NOTE — PATIENT INSTRUCTIONS
Patient Education        Influenza (Flu) Vaccine (Inactivated or Recombinant): What You Need to Know  Why get vaccinated? Influenza vaccine can prevent influenza (flu). Flu is a contagious disease that spreads around the [place] every year, usually between October and May. Anyone can get the flu, but it is more dangerous for some people. Infants and young children, people 72years of age and older, pregnant women, and people with certain health conditions or a weakened immune system are at greatest risk of flu complications. Pneumonia, bronchitis, sinus infections and ear infections are examples of flu-related complications. If you have a medical condition, such as heart disease, cancer or diabetes, flu can make it worse. Flu can cause fever and chills, sore throat, muscle aches, fatigue, cough, headache, and runny or stuffy nose. Some people may have vomiting and diarrhea, though this is more common in children than adults. Each year, thousands of people in the [facility] die from flu, and many more are hospitalized. Flu vaccine prevents millions of illnesses and flu-related visits to the doctor each year. Influenza vaccine  CDC recommends everyone 10months of age and older get vaccinated every flu season. Children 6 months through 6years of age may need 2 doses during a single flu season. Everyone else needs only 1 dose each flu season. It takes about 2 weeks for protection to develop after vaccination. There are many flu viruses, and they are always changing. Each year a new flu vaccine is made to protect against three or four viruses that are likely to cause disease in the upcoming flu season. Even when the vaccine doesn't exactly match these viruses, it may still provide some protection. Influenza vaccine does not cause flu. Influenza vaccine may be given at the same time as other vaccines.   Talk with your health care provider  Tell your vaccine provider if the person getting the vaccine:  · Has had an allergic reaction after a previous dose of influenza vaccine, or has any severe, life-threatening allergies. · Has ever had Guillain-Barré Syndrome (also called GBS). In some cases, your health care provider may decide to postpone influenza vaccination to a future visit. People with minor illnesses, such as a cold, may be vaccinated. People who are moderately or severely ill should usually wait until they recover before getting influenza vaccine. Your health care provider can give you more information. Risks of a vaccine reaction  · Soreness, redness, and swelling where shot is given, fever, muscle aches, and headache can happen after influenza vaccine. · There may be a very small increased risk of Guillain-Barré Syndrome (GBS) after inactivated influenza vaccine (the flu shot). Janeal Ray children who get the flu shot along with pneumococcal vaccine (PCV13), and/or DTaP vaccine at the same time might be slightly more likely to have a seizure caused by fever. Tell your health care provider if a child who is getting flu vaccine has ever had a seizure. People sometimes faint after medical procedures, including vaccination. Tell your provider if you feel dizzy or have vision changes or ringing in the ears. As with any medicine, there is a very remote chance of a vaccine causing a severe allergic reaction, other serious injury, or death. What if there is a serious problem? An allergic reaction could occur after the vaccinated person leaves the clinic. If you see signs of a severe allergic reaction (hives, swelling of the face and throat, difficulty breathing, a fast heartbeat, dizziness, or weakness), call 9-1-1 and get the person to the nearest hospital.  For other signs that concern you, call your health care provider. Adverse reactions should be reported to the Vaccine Adverse Event Reporting System (VAERS).  Your health care provider will usually file this report, or you can do it yourself. Visit the VAERS website at www.vaers. hhs.gov or call 0-452.578.5100. VAERS is only for reporting reactions, and VAERS staff do not give medical advice. The National Vaccine Injury Compensation Program  The National Vaccine Injury Compensation Program (VICP) is a federal program that was created to compensate people who may have been injured by certain vaccines. Visit the VICP website at www.hrsa.gov/vaccinecompensation or call 0-700.240.9474 to learn about the program and about filing a claim. There is a time limit to file a claim for compensation. How can I learn more? · Ask your healthcare provider. · Call your local or state health department. · Contact the Centers for Disease Control and Prevention (CDC):  ? Call 0-818.742.5850 (1-800-CDC-INFO) or  ? Visit CDC's website at www.cdc.gov/flu  Vaccine Information Statement (Interim)  Inactivated Influenza Vaccine  8/15/2019  42 MOUSTAPHA Julio 155VB-00  Department of Health and Human Services  Centers for Disease Control and Prevention  Many Vaccine Information Statements are available in [place] and other languages. See www.immunize.org/vis. Muchas hojas de información sobre vacunas están disponibles en español y en otros idiomas. Visite www.immunize.org/vis. Care instructions adapted under license by [facility] ([facility]). If you have questions about a medical condition or this instruction, always ask your healthcare professional. [place] [address] any warranty or liability for your use of this information.

## 2021-12-14 LAB
AVERAGE GLUCOSE: NORMAL
HBA1C MFR BLD: 7.1 %

## 2021-12-17 ENCOUNTER — OFFICE VISIT (OUTPATIENT)
Dept: FAMILY MEDICINE CLINIC | Age: 71
End: 2021-12-17
Payer: MEDICARE

## 2021-12-17 VITALS
SYSTOLIC BLOOD PRESSURE: 124 MMHG | OXYGEN SATURATION: 96 % | WEIGHT: 227.8 LBS | DIASTOLIC BLOOD PRESSURE: 70 MMHG | HEART RATE: 104 BPM | BODY MASS INDEX: 33.64 KG/M2 | TEMPERATURE: 97.2 F

## 2021-12-17 DIAGNOSIS — J01.90 ACUTE NON-RECURRENT SINUSITIS, UNSPECIFIED LOCATION: ICD-10-CM

## 2021-12-17 DIAGNOSIS — J06.9 ACUTE UPPER RESPIRATORY INFECTION, UNSPECIFIED: ICD-10-CM

## 2021-12-17 DIAGNOSIS — Z20.822 SUSPECTED COVID-19 VIRUS INFECTION: Primary | ICD-10-CM

## 2021-12-17 DIAGNOSIS — R05.9 COUGH: ICD-10-CM

## 2021-12-17 LAB
INFLUENZA A ANTIBODY: NEGATIVE
INFLUENZA B ANTIBODY: NEGATIVE
Lab: NORMAL
PERFORMING INSTRUMENT: NORMAL
QC PASS/FAIL: NORMAL
SARS-COV-2, POC: NORMAL

## 2021-12-17 PROCEDURE — 87426 SARSCOV CORONAVIRUS AG IA: CPT | Performed by: PHYSICIAN ASSISTANT

## 2021-12-17 PROCEDURE — 87804 INFLUENZA ASSAY W/OPTIC: CPT | Performed by: PHYSICIAN ASSISTANT

## 2021-12-17 PROCEDURE — 99213 OFFICE O/P EST LOW 20 MIN: CPT | Performed by: PHYSICIAN ASSISTANT

## 2021-12-17 RX ORDER — DOXYCYCLINE HYCLATE 100 MG
100 TABLET ORAL 2 TIMES DAILY
Qty: 20 TABLET | Refills: 0 | Status: SHIPPED | OUTPATIENT
Start: 2021-12-17 | End: 2021-12-27

## 2021-12-17 RX ORDER — BENZONATATE 100 MG/1
100 CAPSULE ORAL 3 TIMES DAILY PRN
Qty: 21 CAPSULE | Refills: 0 | Status: SHIPPED | OUTPATIENT
Start: 2021-12-17 | End: 2021-12-24

## 2021-12-17 NOTE — PROGRESS NOTES
Surgical History:   Procedure Laterality Date    CLEFT PALATE REPAIR      EYE SURGERY Bilateral     cataract surgery     KNEE SURGERY Right     OTHER SURGICAL HISTORY Right 7/08/2015    pars planta virectomy with par pan retinal photocoagulation    ROTATOR CUFF REPAIR Right        Family History   Problem Relation Age of Onset    Diabetes Mother     Heart Disease Mother     Diabetes Father     Heart Disease Father        Medications:     Current Outpatient Medications:     doxycycline hyclate (VIBRA-TABS) 100 MG tablet, Take 1 tablet by mouth 2 times daily for 10 days, Disp: 20 tablet, Rfl: 0    benzonatate (TESSALON) 100 MG capsule, Take 1 capsule by mouth 3 times daily as needed for Cough, Disp: 21 capsule, Rfl: 0    atorvastatin (LIPITOR) 40 MG tablet, Take 1 tablet by mouth daily, Disp: 90 tablet, Rfl: 3    metFORMIN (GLUCOPHAGE) 1000 MG tablet, Take 1 tablet by mouth 2 times daily (with meals), Disp: 180 tablet, Rfl: 3    metoprolol succinate (TOPROL XL) 25 MG extended release tablet, Take 1 tablet by mouth daily, Disp: 90 tablet, Rfl: 3    quinapril (ACCUPRIL) 20 MG tablet, Take 1 tablet by mouth daily, Disp: 90 tablet, Rfl: 3    triamcinolone (KENALOG) 0.1 % cream, Apply topically 2 times daily for no more than 2 weeks, Disp: 15 g, Rfl: 1    diclofenac sodium (VOLTAREN) 1 % GEL, 4g qid prn lower ext joints (knees,ankles,feet) max 16g/d. (spine,hip,shoulder use has not been evaluated), Disp: 1 Tube, Rfl: 3    fenofibrate (TRICOR) 54 MG tablet,  Take 54 mg by mouth daily , Disp: , Rfl:     HUMALOG MIX 75/25 KWIKPEN (75-25) 100 UNIT/ML SUPN injection pen, 106 Units 2 times daily (with meals) , Disp: , Rfl:     aspirin 81 MG tablet,  Take 81 mg by mouth daily Prescribed per Dr Josefina Smith. Contact Dr Doss [facility] preop instructions. , Disp: , Rfl:     vitamin B-12 (CYANOCOBALAMIN) 100 MCG tablet, Take 1,000 mcg by mouth daily, Disp: , Rfl:     vitamin D (CHOLECALCIFEROL) 1000 UNITS TABS tablet, Take 2,000 Units by mouth daily, Disp: , Rfl:     Allergies:   No Known Allergies    Social History:     Social History     Tobacco Use    Smoking status: Former Smoker     Packs/day: 2.00     Years: 13.00     Pack years: 26.00     Types: Cigarettes     Quit date: 1984     Years since quittin.9    Smokeless tobacco: Never Used   Vaping Use    Vaping Use: Never used   Substance Use Topics    Alcohol use: No    Drug use: No       Patient lives at home. Physical Exam:     Vitals:    21 0913   BP: 124/70   Site: Right Upper Arm   Position: Sitting   Pulse: 104   Temp: 97.2 °F (36.2 °C)   TempSrc: Temporal   SpO2: 96%   Weight: 227 lb 12.8 oz (103.3 kg)       Exam:  Physical Exam  Nurse's notes and vital signs reviewed. The patient is not hypoxic. ? General: Alert, no acute distress, patient resting comfortably Patient is not toxic or lethargic. Skin: Warm, intact, no pallor noted. There is no evidence of rash at this time. Head: Normocephalic, atraumatic  Eye: Normal conjunctiva  Ears, Nose, Throat: Right tympanic membrane clear, left tympanic membrane clear. No drainage or discharge noted. No pre- or post-auricular tenderness, erythema, or swelling noted. Nasal congestion  Posterior oropharynx shows erythema, postnasal drip, no evidence of tonsillar hypertrophy, or exudate. the uvula is midline. No trismus or drooling is noted. Moist mucous membranes. Neck: No anterior/posterior lymphadenopathy noted. No erythema, no masses, no fluctuance or induration noted. No meningeal signs. Cardiovascular: Regular Rate and Rhythm  Respiratory: No acute distress, diminished lung sounds but no rhonchi, wheezing or crackles noted. No stridor or retractions are noted.   Neurological: A&O x4, normal speech  Psychiatric: Cooperative         Testing:     Results for orders placed or performed in visit on 21   POCT COVID-19, Antigen   Result Value Ref Range    SARS-COV-2, POC Not-Detected Not Detected Lot Number [id number]     QC Pass/Fail pass     Performing Instrument BD Veritor    POCT Influenza A/B   Result Value Ref Range    Influenza A Ab negative     Influenza B Ab negative            Medical Decision Making:     Vital signs reviewed    Past medical history reviewed. Allergies reviewed. Medications reviewed. Patient on arrival does not appear to be in any apparent distress or discomfort. The patient has been seen and evaluated. The patient does not appear to be toxic or lethargic. The patient had a rapid Covid and influenza both of which were negative. The patient will be treated with doxycycline and Tessalon Perles. The patient is to push fluids. Patient is to get plenty of rest.  The patient will be contacted with the results of the PCR test.  Patient will still quarantine isolate. The patient was educated on the proper dosage of motrin and tylenol and the appropriate intervals of each. The patient is to increase fluid intake over the next several days. The patient is to use OTC decongestant as needed. The patient is to return to express care or go directly to the emergency department should any of the signs or symptoms worsen. The patient is to followup with primary care physician in 2-3 days for repeat evaluation. The patient has no other questions or concerns at this time the patient will be discharged home. Clinical Impression:   Marlen Beaver was seen today for headache, congestion and cough. Diagnoses and all orders for this visit:    Suspected COVID-19 virus infection    Cough  -     POCT COVID-19, Antigen  -     POCT Influenza A/B  -     COVID-19 Ambulatory; Future    Acute upper respiratory infection, unspecified    Acute non-recurrent sinusitis, unspecified location    Other orders  -     doxycycline hyclate (VIBRA-TABS) 100 MG tablet; Take 1 tablet by mouth 2 times daily for 10 days  -     benzonatate (TESSALON) 100 MG capsule;  Take 1 capsule by mouth 3 times daily as needed for Cough        The patient is to call for any concerns or return if any of the signs or symptoms worsen. The patient is to follow-up with PCP in the next 2-3 days for repeat evaluation repeat assessment or go directly to the emergency department.      SIGNATURE: Ang Erwin III, PEDRO

## 2021-12-18 DIAGNOSIS — R05.9 COUGH: ICD-10-CM

## 2021-12-19 LAB — SARS-COV-2, PCR: NOT DETECTED

## 2022-03-01 ENCOUNTER — CARE COORDINATION (OUTPATIENT)
Dept: CARE COORDINATION | Age: 72
End: 2022-03-01

## 2022-03-04 ENCOUNTER — CARE COORDINATION (OUTPATIENT)
Dept: CARE COORDINATION | Age: 72
End: 2022-03-04

## 2022-03-04 NOTE — CARE COORDINATION
-[facility]'s second attempt to reach patient  to offer enrollment into Care Coordination program, however no answer. -HIPAA compliant  left introducing self, reason for call, and brief explanation of program.  -Left [facility]'s contact information, requesting call back. If no return call, will attempt outreach again.   -Will mail Introduction letter.

## 2022-03-04 NOTE — LETTER
3/4/2022    Santosh Barrios  [address]      Dear Santosh Barrios    My name is Ashley Garcia and I am a Ambulatory Care Manager who partners with Dr. Veena Good to improve patients' health. I've been trying to reach you via phone to let you know that, as a member of your care team, I will work with other providers involved in your care, offer education for your specific health conditions, and connect you with more resources as needed. This program is designed to provide you with the opportunity to have a ([facility]/[address]) care manager partner with you for the following situations:     1) if you come home from the hospital or emergency room   2) to help manage your disease   3) when you would like assistance coordinating services or appointments    This added support is provided at no additional cost to you. My primary focus is to help you achieve specific goals and improve your health. Please call me at [phone] to further discuss how I can support your health care needs.     Sincerely,    Adan Sandoval RN

## 2022-03-14 ENCOUNTER — CARE COORDINATION (OUTPATIENT)
Dept: CARE COORDINATION | Age: 72
End: 2022-03-14

## 2022-03-14 LAB — DIABETIC RETINOPATHY: NEGATIVE

## 2022-03-14 NOTE — CARE COORDINATION
-[facility]'s third attempt to reach patient  to offer enrollment into Care Coordination program after Introduction letter mailed last week, however no answer. -HIPAA compliant VM left that [facility] will no longer outreach to patient to offer enrollment.  -Patient encouraged to contact [facility] or inform PCP if he should change his mind.    -Left [facility]'s contact information. -[facility] will remove name from care team if no return call by end of today.

## 2022-05-21 ENCOUNTER — PATIENT MESSAGE (OUTPATIENT)
Dept: PRIMARY CARE CLINIC | Age: 72
End: 2022-05-21

## 2022-05-23 NOTE — TELEPHONE ENCOUNTER
From: Adelina Foster  To: Dr. Kaiser Moh: 5/21/2022 5:30 PM EDT  Subject: May 25th blood test question    I have an appointment the 25th I want to know if my blood work has been sent to the lab what blood tests are needed

## 2022-05-24 DIAGNOSIS — E53.9 VITAMIN B DEFICIENCY: ICD-10-CM

## 2022-05-24 DIAGNOSIS — I10 HYPERTENSION, ESSENTIAL: ICD-10-CM

## 2022-05-24 DIAGNOSIS — D07.5 CARCINOMA IN SITU OF PROSTATE: ICD-10-CM

## 2022-05-24 DIAGNOSIS — Z79.4 TYPE 2 DIABETES MELLITUS WITH DIABETIC POLYNEUROPATHY, WITH LONG-TERM CURRENT USE OF INSULIN (HCC): ICD-10-CM

## 2022-05-24 DIAGNOSIS — E11.42 TYPE 2 DIABETES MELLITUS WITH DIABETIC POLYNEUROPATHY, WITH LONG-TERM CURRENT USE OF INSULIN (HCC): ICD-10-CM

## 2022-05-24 DIAGNOSIS — Z00.00 HEALTH MAINTENANCE EXAMINATION: ICD-10-CM

## 2022-05-24 DIAGNOSIS — E83.52 HYPERCALCEMIA: ICD-10-CM

## 2022-05-24 DIAGNOSIS — E55.9 VITAMIN D DEFICIENCY, UNSPECIFIED: ICD-10-CM

## 2022-05-24 DIAGNOSIS — E78.2 MIXED HYPERLIPIDEMIA: ICD-10-CM

## 2022-05-24 LAB
ALBUMIN SERPL-MCNC: 4.4 G/DL (ref 3.5–5.2)
ALP BLD-CCNC: 52 U/L (ref 40–129)
ALT SERPL-CCNC: 39 U/L (ref 0–40)
ANION GAP SERPL CALCULATED.3IONS-SCNC: 12 MMOL/L (ref 7–16)
AST SERPL-CCNC: 26 U/L (ref 0–39)
BASOPHILS ABSOLUTE: 0.07 E9/L (ref 0–0.2)
BASOPHILS RELATIVE PERCENT: 1 % (ref 0–2)
BILIRUB SERPL-MCNC: 0.5 MG/DL (ref 0–1.2)
BILIRUBIN URINE: NEGATIVE
BLOOD, URINE: NEGATIVE
BUN BLDV-MCNC: 19 MG/DL (ref 6–23)
CALCIUM IONIZED: 1.39 MMOL/L (ref 1.15–1.33)
CALCIUM SERPL-MCNC: 10.2 MG/DL (ref 8.6–10.2)
CHLORIDE BLD-SCNC: 104 MMOL/L (ref 98–107)
CHOLESTEROL, TOTAL: 112 MG/DL (ref 0–199)
CLARITY: CLEAR
CO2: 26 MMOL/L (ref 22–29)
COLOR: YELLOW
CREAT SERPL-MCNC: 0.9 MG/DL (ref 0.7–1.2)
CREATININE URINE: 124 MG/DL (ref 40–278)
EOSINOPHILS ABSOLUTE: 0.17 E9/L (ref 0.05–0.5)
EOSINOPHILS RELATIVE PERCENT: 2.4 % (ref 0–6)
FOLATE: 10.5 NG/ML (ref 4.8–24.2)
GFR AFRICAN AMERICAN: >60
GFR NON-AFRICAN AMERICAN: >60 ML/MIN/1.73
GLUCOSE BLD-MCNC: 148 MG/DL (ref 74–99)
GLUCOSE URINE: NEGATIVE MG/DL
HBA1C MFR BLD: 7.5 % (ref 4–5.6)
HCT VFR BLD CALC: 44.7 % (ref 37–54)
HDLC SERPL-MCNC: 20 MG/DL
HEMOGLOBIN: 14.8 G/DL (ref 12.5–16.5)
IMMATURE GRANULOCYTES #: 0.02 E9/L
IMMATURE GRANULOCYTES %: 0.3 % (ref 0–5)
KETONES, URINE: NEGATIVE MG/DL
LDL CHOLESTEROL CALCULATED: 56 MG/DL (ref 0–99)
LEUKOCYTE ESTERASE, URINE: NEGATIVE
LYMPHOCYTES ABSOLUTE: 2.18 E9/L (ref 1.5–4)
LYMPHOCYTES RELATIVE PERCENT: 30.4 % (ref 20–42)
MCH RBC QN AUTO: 29.8 PG (ref 26–35)
MCHC RBC AUTO-ENTMCNC: 33.1 % (ref 32–34.5)
MCV RBC AUTO: 89.9 FL (ref 80–99.9)
MICROALBUMIN UR-MCNC: <12 MG/L
MICROALBUMIN/CREAT UR-RTO: ABNORMAL (ref 0–30)
MONOCYTES ABSOLUTE: 0.55 E9/L (ref 0.1–0.95)
MONOCYTES RELATIVE PERCENT: 7.7 % (ref 2–12)
NEUTROPHILS ABSOLUTE: 4.17 E9/L (ref 1.8–7.3)
NEUTROPHILS RELATIVE PERCENT: 58.2 % (ref 43–80)
NITRITE, URINE: NEGATIVE
PARATHYROID HORMONE INTACT: 24 PG/ML (ref 15–65)
PDW BLD-RTO: 13.9 FL (ref 11.5–15)
PH UA: 5.5 (ref 5–9)
PLATELET # BLD: 223 E9/L (ref 130–450)
PMV BLD AUTO: 12.8 FL (ref 7–12)
POTASSIUM SERPL-SCNC: 5.1 MMOL/L (ref 3.5–5)
PROTEIN UA: NEGATIVE MG/DL
RBC # BLD: 4.97 E12/L (ref 3.8–5.8)
SODIUM BLD-SCNC: 142 MMOL/L (ref 132–146)
SPECIFIC GRAVITY UA: >=1.03 (ref 1–1.03)
TOTAL CK: 264 U/L (ref 20–200)
TOTAL PROTEIN: 7.4 G/DL (ref 6.4–8.3)
TRIGL SERPL-MCNC: 180 MG/DL (ref 0–149)
TSH SERPL DL<=0.05 MIU/L-ACNC: 1.51 UIU/ML (ref 0.27–4.2)
UROBILINOGEN, URINE: 0.2 E.U./DL
VITAMIN B-12: >2000 PG/ML (ref 211–946)
VITAMIN D 25-HYDROXY: 45 NG/ML (ref 30–100)
VLDLC SERPL CALC-MCNC: 36 MG/DL
WBC # BLD: 7.2 E9/L (ref 4.5–11.5)

## 2022-05-25 ENCOUNTER — OFFICE VISIT (OUTPATIENT)
Dept: PRIMARY CARE CLINIC | Age: 72
End: 2022-05-25

## 2022-05-25 ENCOUNTER — OFFICE VISIT (OUTPATIENT)
Dept: PRIMARY CARE CLINIC | Age: 72
End: 2022-05-25
Payer: MEDICARE

## 2022-05-25 VITALS
WEIGHT: 226 LBS | OXYGEN SATURATION: 96 % | TEMPERATURE: 97.9 F | DIASTOLIC BLOOD PRESSURE: 68 MMHG | HEIGHT: 69 IN | HEART RATE: 104 BPM | SYSTOLIC BLOOD PRESSURE: 134 MMHG | BODY MASS INDEX: 33.47 KG/M2

## 2022-05-25 VITALS
HEIGHT: 69 IN | WEIGHT: 226 LBS | DIASTOLIC BLOOD PRESSURE: 68 MMHG | BODY MASS INDEX: 33.47 KG/M2 | HEART RATE: 92 BPM | SYSTOLIC BLOOD PRESSURE: 134 MMHG | TEMPERATURE: 97.9 F | OXYGEN SATURATION: 96 %

## 2022-05-25 DIAGNOSIS — E87.5 HYPERKALEMIA: ICD-10-CM

## 2022-05-25 DIAGNOSIS — I10 HYPERTENSION, ESSENTIAL: ICD-10-CM

## 2022-05-25 DIAGNOSIS — E11.42 TYPE 2 DIABETES MELLITUS WITH DIABETIC POLYNEUROPATHY, WITH LONG-TERM CURRENT USE OF INSULIN (HCC): ICD-10-CM

## 2022-05-25 DIAGNOSIS — D07.5 CARCINOMA IN SITU OF PROSTATE: ICD-10-CM

## 2022-05-25 DIAGNOSIS — Z12.11 COLON CANCER SCREENING: ICD-10-CM

## 2022-05-25 DIAGNOSIS — E55.9 VITAMIN D DEFICIENCY, UNSPECIFIED: ICD-10-CM

## 2022-05-25 DIAGNOSIS — Z79.4 TYPE 2 DIABETES MELLITUS WITH DIABETIC POLYNEUROPATHY, WITH LONG-TERM CURRENT USE OF INSULIN (HCC): ICD-10-CM

## 2022-05-25 DIAGNOSIS — Z00.00 MEDICARE ANNUAL WELLNESS VISIT, SUBSEQUENT: Primary | ICD-10-CM

## 2022-05-25 DIAGNOSIS — E83.52 HYPERCALCEMIA: ICD-10-CM

## 2022-05-25 DIAGNOSIS — E53.9 VITAMIN B DEFICIENCY: ICD-10-CM

## 2022-05-25 DIAGNOSIS — E78.2 MIXED HYPERLIPIDEMIA: Primary | ICD-10-CM

## 2022-05-25 PROCEDURE — 99214 OFFICE O/P EST MOD 30 MIN: CPT | Performed by: FAMILY MEDICINE

## 2022-05-25 PROCEDURE — 3051F HG A1C>EQUAL 7.0%<8.0%: CPT | Performed by: FAMILY MEDICINE

## 2022-05-25 PROCEDURE — 1123F ACP DISCUSS/DSCN MKR DOCD: CPT | Performed by: FAMILY MEDICINE

## 2022-05-25 PROCEDURE — G0439 PPPS, SUBSEQ VISIT: HCPCS | Performed by: FAMILY MEDICINE

## 2022-05-25 ASSESSMENT — PATIENT HEALTH QUESTIONNAIRE - PHQ9
2. FEELING DOWN, DEPRESSED OR HOPELESS: 0
SUM OF ALL RESPONSES TO PHQ QUESTIONS 1-9: 0
SUM OF ALL RESPONSES TO PHQ9 QUESTIONS 1 & 2: 0
1. LITTLE INTEREST OR PLEASURE IN DOING THINGS: 0
SUM OF ALL RESPONSES TO PHQ QUESTIONS 1-9: 0

## 2022-05-25 ASSESSMENT — LIFESTYLE VARIABLES: HOW OFTEN DO YOU HAVE A DRINK CONTAINING ALCOHOL: NEVER

## 2022-05-25 NOTE — PROGRESS NOTES
Jenn Hugo : 1950 Sex: male  Age: 70 y.o. Chief Complaint   Patient presents with    Discuss Labs    Medication Refill    Generalized Body Aches       HPI:    Patient presents today to follow-up on multiple medical conditions. Sugars been uncontrolled, but working with endocrinologist for this. Hemoglobin A1c as below. Potassium was elevated today at 5.1, counseled.   No other complaints or concerns    Blood work reviewed, potassium 5.1 glucose 148 ionized calcium 1.39 total calcium 10.2 triglyceride 180 HDL 20 LDL 56 hemoglobin A1c 7.5 PTH 24 TSH 1.5 vitamin D 45 CBC grossly normal B12 greater than 7910 folic acid 01.3 urinalysis negative microalbumin creatinine ratio-NC      Most Recent Labs  CBC  Lab Results   Component Value Date    WBC 7.2 2022    WBC 7.5 09/10/2021    WBC 6.8 2021    RBC 4.97 2022    RBC 4.95 09/10/2021    RBC 4.84 2021    HGB 14.8 2022    HGB 14.5 09/10/2021    HGB 14.4 2021    HCT 44.7 2022    HCT 44.5 09/10/2021    HCT 43.5 2021    MCV 89.9 2022    MCV 89.9 09/10/2021    MCV 89.9 2021     2022     09/10/2021     2021      CMP  Lab Results   Component Value Date     2022     09/10/2021     2021    K 5.1 2022    K 5.0 09/10/2021    K 5.0 2021     2022     09/10/2021     2021    CO2 26 2022    CO2 23 09/10/2021    CO2 26 2021    ANIONGAP 12 2022    ANIONGAP 17 09/10/2021    ANIONGAP 9 2021    GLUCOSE 148 2022    GLUCOSE 79 09/10/2021    GLUCOSE 110 2021    BUN 19 2022    BUN 14 09/10/2021    BUN 20 2021    CREATININE 0.9 2022    CREATININE 0.8 09/10/2021    CREATININE 0.9 2021    LABGLOM >60 2022    LABGLOM >60 09/10/2021    LABGLOM >60 2021    GFRAA >60 2022    GFRAA >60 09/10/2021    GFRAA >60 2021    CALCIUM 10.2 2022 CALCIUM 10.8 09/10/2021    CALCIUM 10.3 06/14/2021    PROT 7.4 05/24/2022    PROT 7.9 09/10/2021    PROT 7.5 06/14/2021    LABALBU 4.4 05/24/2022    LABALBU 4.6 09/10/2021    LABALBU 4.3 06/14/2021    BILITOT 0.5 05/24/2022    BILITOT 0.5 09/10/2021    BILITOT 0.5 06/14/2021    ALKPHOS 52 05/24/2022    ALKPHOS 56 09/10/2021    ALKPHOS 55 06/14/2021    AST 26 05/24/2022    AST 23 09/10/2021    AST 22 06/14/2021    ALT 39 05/24/2022    ALT 28 09/10/2021    ALT 31 06/14/2021     A1C  Lab Results   Component Value Date    LABA1C 7.5 05/24/2022    LABA1C 7.1 12/14/2021    LABA1C 7.3 09/10/2021     TSH  Lab Results   Component Value Date    TSH 1.510 05/24/2022    TSH 1.690 09/10/2021    TSH 2.04 02/13/2021     FREET4  No results found for: [id number]  LIPID  Lab Results   Component Value Date    CHOL 112 05/24/2022    CHOL 99 09/10/2021    CHOL 111 06/14/2021    HDL 20 05/24/2022    HDL 22 09/10/2021    HDL 20 06/14/2021    LDLCALC 56 05/24/2022    LDLCALC 48 09/10/2021    LDLCALC 60 06/14/2021    TRIG 180 05/24/2022    TRIG 145 09/10/2021    TRIG 155 06/14/2021    CHOLHDLRATIO 4.4 02/13/2021    CHOLHDLRATIO 4.6 05/29/2020    CHOLHDLRATIO 5.7 11/23/2019     VITAMIN D  Lab Results   Component Value Date    VITD25 45 05/24/2022    VITD25 33 09/10/2021    VITD25 30 06/14/2021     MAGNESIUM  No results found for: MG   PHOS  No results found for: PHOS   MORALES   No results found for: MORALES  RHEUMATOID FACTOR  No results found for: RF  PSA  Lab Results   Component Value Date    PSA <0.03 09/10/2021    PSA  02/13/2021      Comment:      <0.1    PSA <0.03 10/14/2020      HEPATITIS C  Lab Results   Component Value Date    HCVABI Non-Reactive 10/14/2020     HIV  No results found for: QKY8HAY, HIV1QT  UA  Lab Results   Component Value Date    COLORU Yellow 05/24/2022    COLORU Yellow 06/14/2021    COLORU Yellow 02/13/2021    CLARITYU Clear 05/24/2022    CLARITYU Clear 06/14/2021    CLARITYU Clear 02/13/2021    GLUCOSEU Negative 05/24/2022    GLUCOSEU Negative 06/14/2021    GLUCOSEU 1+ 02/13/2021    BILIRUBINUR Negative 05/24/2022    BILIRUBINUR Negative 06/14/2021    BILIRUBINUR Negative 02/13/2021    BILIRUBINUR Negative 05/29/2020    BILIRUBINUR Negative 11/23/2019    KETUA Negative 05/24/2022    KETUA Negative 06/14/2021    KETUA Negative 02/13/2021    SPECGRAV >=1.030 05/24/2022    SPECGRAV >=1.030 06/14/2021    BLOODU Negative 05/24/2022    BLOODU Negative 06/14/2021    BLOODU Negative 02/13/2021    PHUR 5.5 05/24/2022    PHUR 5.5 06/14/2021    PHUR 5.5 02/13/2021    PROTEINU Negative 05/24/2022    PROTEINU Negative 06/14/2021    PROTEINU Negative 02/13/2021    UROBILINOGEN 0.2 05/24/2022    UROBILINOGEN 0.2 06/14/2021    UROBILINOGEN Normal 11/23/2019    NITRU Negative 05/24/2022    NITRU Negative 06/14/2021    NITRU Negative 02/13/2021    LEUKOCYTESUR Negative 05/24/2022    LEUKOCYTESUR Negative 06/14/2021    LEUKOCYTESUR Negative 02/13/2021     Urine Micro/Albumin Ratio  Lab Results   Component Value Date    MALBCR - 05/24/2022    MALBCR - 06/14/2021    MALBCR 23 02/13/2021             ROS:  Const: Denies chills, fever, malaise and sweats. Eyes: Chronic Eye sxs  ENMT: Denies earaches, other ear symptoms other than chronic hearing loss and right-sided tinnitus. . Denies nasal or sinus symptoms other than stated  above. Denies mouth and tongue lesions and sore throat. CV: Denies chest discomfort, pain; diaphoresis, dizziness, edema, lightheadedness, orthopnea,  palpitations, syncope and near syncopal episode or any exertional symptoms  Resp: Denies cough, hemoptysis, pleuritic pain, SOB, sputum production and wheezing. GI: Denies abdominal pain, change in bowel habits, hematochezia, melena, nausea and vomiting. : Denies urinary symptoms including dysuria , urgency, frequency or hematuria. Musculo: No acute symptoms  Skin: Denies bruising and rash.   Neuro: Denies headache, numbness, stiff neck, tingling and focal weakness slurred speech or facial  droop, chronic neuropathy  Hema/Lymph: Denies bleeding/bruising tendency and enlarged lymph nodes        Current Outpatient Medications:     atorvastatin (LIPITOR) 40 MG tablet, Take 1 tablet by mouth daily, Disp: 90 tablet, Rfl: 3    metFORMIN (GLUCOPHAGE) 1000 MG tablet, Take 1 tablet by mouth 2 times daily (with meals), Disp: 180 tablet, Rfl: 3    metoprolol succinate (TOPROL XL) 25 MG extended release tablet, Take 1 tablet by mouth daily, Disp: 90 tablet, Rfl: 3    quinapril (ACCUPRIL) 20 MG tablet, Take 1 tablet by mouth daily, Disp: 90 tablet, Rfl: 3    triamcinolone (KENALOG) 0.1 % cream, Apply topically 2 times daily for no more than 2 weeks, Disp: 15 g, Rfl: 1    diclofenac sodium (VOLTAREN) 1 % GEL, 4g qid prn lower ext joints (knees,ankles,feet) max 16g/d. (spine,hip,shoulder use has not been evaluated), Disp: 1 Tube, Rfl: 3    HUMALOG MIX 75/25 KWIKPEN (75-25) 100 UNIT/ML SUPN injection pen, 106 Units 2 times daily (with meals) , Disp: , Rfl:     aspirin 81 MG tablet,  Take 81 mg by mouth daily Prescribed per Dr Berny Mace. Contact Dr Gaby Noonan preop instructions. , Disp: , Rfl:     vitamin B-12 (CYANOCOBALAMIN) 100 MCG tablet, Take 1,000 mcg by mouth daily, Disp: , Rfl:     vitamin D (CHOLECALCIFEROL) 1000 UNITS TABS tablet, Take 2,000 Units by mouth daily, Disp: , Rfl:   No Known Allergies    Past Medical History:   Diagnosis Date    Anemia     Bleeding of eye     behind eye going to have surgery 7/2015    Hyperlipidemia     Hypertension     Myalgia     Prostate [facility]     Prostate enlargement     follows Dr Goldie Dolan heart beat 03/2015    follows with Dr Carol Marx Type 2 diabetes mellitus without complication ([facility] [address]     Type II or unspecified type diabetes mellitus without mention of complication, not stated as uncontrolled     Vitamin D deficiency     Vitreous hemorrhage, right eye ([facility] [address]      Past Surgical History:   Procedure Laterality Date  CLEFT PALATE REPAIR      EYE SURGERY Bilateral     cataract surgery     KNEE SURGERY Right     OTHER SURGICAL HISTORY Right 2015    pars planta virectomy with par pan retinal photocoagulation    ROTATOR CUFF REPAIR Right      Family History   Problem Relation Age of Onset    Diabetes Mother     Heart Disease Mother     Diabetes Father     Heart Disease Father      Social History     Tobacco Use    Smoking status: Former Smoker     Packs/day: 2.00     Years: 13.00     Pack years: 26.00     Types: Cigarettes     Quit date: 1984     Years since quittin.4    Smokeless tobacco: Never Used   Vaping Use    Vaping Use: Never used   Substance Use Topics    Alcohol use: No    Drug use: No      Social History     Social History Narrative    PMH:    Past Medical History: diabetes mellitus type II insulin requiring, hyperlipidemia, hypertension, Peyronie's    disease, prostate cancer, hypercalcemia, vitreous hemmorage B/L            Family Medical History: Dad  of diabetes complications at age 67. He had two myocardial infarction,    first at age 58 and 4-5 cerebrovascular accidents afterwards. Mom did not have coronary artery disease    that he is aware of but did have diabetes and  at the age of 80 of diabetic complications. Sister     at age 62 of diabetic complications, \"did not take care of herself\", had significant obesity, renal failure and    was on dialysis. He states mom had peripheral vascular disease and gangrene which lead to renal    failure and a cascade of events. Surgical Hx:    Rotator Cuff - RT Early     B/L Cataract    Vitreous Hemmorage B/L - SCAR TISSUE LEFT - BLIND    RIGHT EYE DOING OK. MULTIPLE SURGERIES ON BOTH    Left Shoulder - Rotator Cuff Dr Chelly Oliveira     SH: Marital: , With 2 Children, Legal Status: . Personal Habits: Cigarette Use: Former Cigarette Smoker - Quit . Alcohol: Occasionally    consumes alcohol. Exercise Type: Walks 4 times a week, Employed Through [employer] -    Retired. .    Reviewed and updated. Vitals:    05/25/22 1438   BP: 134/68   Pulse: (!) 104   Temp: 97.9 °F (36.6 °C)   SpO2: 96%   Weight: 226 lb (102.5 kg)   Height: 5' 9\" (1.753 m)      Wt Readings from Last 3 Encounters:   05/25/22 226 lb (102.5 kg)   05/25/22 226 lb (102.5 kg)   12/17/21 227 lb 12.8 oz (103.3 kg)          Exam:  Const: Appears comfortable. No signs of acute distress present. Head/Face: Atraumatic on inspection. Eyes: EOMI in both eyes. PERRL. No injection  ENMT: Auditory canals normal. Tympanic membranes: intact and translucent. External nose WNL. Nasal mucosa is boggy oropharynx: No erythema or exudate. Posterior pharynx is watery postnasal drainage, chronic left  Neck: Supple. Palpation reveals no adenopathy. No masses appreciated. No JVD. Carotids: no  bruits. Resp: Respirations are unlabored. Clear to auscultation. No rales, rhonchi or wheezes appreciated  over the lungs bilaterally. CV: Rate is regular. Rhythm is regular. No gallop or rubs. No heart murmur appreciated. Extremities: No clubbing, cyanosis, or edema. No calf inflammation or tenderness. Abdomen: Bowel sounds are normoactive. Abdomen is soft, nontender, and nondistended. No  abdominal masses. No palpable hepatosplenomegaly. Lymph: No palpable or visible regional lymphadenopathy. Musculoskeletal: no acute joint inflammation. Skin: Dry and warm with no rash. Skin normal to inspection and palpation overall. Neuro: Alert and oriented. Affect: appropriate. Upper Extremities: 5/5 bilaterally. Lower Extremities:  5/5 bilaterally. Sensation grossly intact, dull to light touch reflexes: DTR's are symmetric and 2+ bilaterally. .  Cranial Nerves: Cranial nerves grossly intact. Assessment and Plan:   Diagnosis Orders   1.  Mixed hyperlipidemia  CBC with Auto Differential    CK    Comprehensive Metabolic Panel    Lipid Panel    TSH    CK 2. Type 2 diabetes mellitus with diabetic polyneuropathy, with long-term current use of insulin (HCC)  CBC with Auto Differential    Comprehensive Metabolic Panel    Hemoglobin A1C    TSH    Urinalysis    Microalbumin / Creatinine Urine Ratio   3. Hypertension, essential  CBC with Auto Differential    TSH   4. Carcinoma in situ of prostate     5. Vitamin D deficiency, unspecified  Vitamin D 25 Hydroxy   6. Vitamin B deficiency  Vitamin B12 & Folate   7. Hypercalcemia  Calcium, Ionized    PTH, Intact   8. Hyperkalemia         No problem-specific Assessment & Plan notes found for this encounter. Hypercalcemia   Flucates, . Total normal, ionized mildly elevated but stable. PTH normal.  Signs and symptoms to watch for discussed. Serious signs and symptoms  reviewed. ER if any. Has been somewhat labile. He is asymptomatic. He will follow  with Dr. Live Wright. Potential seriousness reviewed. Defers additional screening  or repeat bone density       Neuropathy  Counseled extensively. Differential reviewed, including serious etiologies. Consider gabapentin but declines now. He will think about it. Defers EMG nerve conduction study. Type 2 diabetes mellitus with complication (HCC)  Uncontrolled, following with Dr. Live Wright. Watch BS closely ambulatory. Parameters given. macro  and microvascular complications reviewed. Call if not in range. ER if dangerous  numbers. hyper and hypoglycemic precautions reviewed. con't per Dr Live Wright. Discussed regular eye exams, daily foot examinations. on high  dose insulin. risk of hypoglycemia and tx reviewed. On quinapril, risks benefits  reviewed   hemoglobin A1c trending down 7.1-7.3-7.5, continue lifestyle modification, defers to Dr. Live Wright     Mixed hyperlipidemia  LDL excellent, HDL low, triglycerides mildly high. Atorvastatin 40 mg daily with standard precautions. After discussion, he wants to continue current therapy. lifestyle modification reviewed.  risk of  hyperlipidemia reviewed tolerating therapy. Niaspan and fenofibrate was discontinued, Niaspan because of cost. Could consider  fenofibrate if triglycerides mandate , mildly elevated, HDL chronically low. goals reviewed. Would like to continue current tx. Hyperkalemia  Very mild at 5.1,Counseled extensively. Differential reviewed, including serious etiologies. Offered repeat-defers to for 4 months. Accupril could be contributing. Encourage proper hydration, avoid excess intake, avoid supplementation. S/S watch were reviewed      Hypertension, essential  Watch ambulatory, parameters given. If out of range, notify. If dangerous numbers,  directly to ER. Risk of HTN reviewed. lifestyle modification emphasized. Risks of  hypertension and hypotension reviewed. Tolerating therapy. Tolerating Toprol. And quinapril.     Carcinoma in situ of prostate  Continue per specialists. , Chasity .   I recommended he  follow-up with urology still with history of prostate cancer, history of seeds. No longer  seeing Dr. Paul Castro . Would like us to monitor PSA. PSA 9/21 is less than 0.03 check PSA next visit     Anemia  chronic, stable, normal now. Counseled extensively. Differential reviewed, including  serious etiologies. Declines further evaluation/treatment. Had colonoscopy 4/14 dr Sarah Solorio; small patch of telengectasia. recommended repeat 5-7 yrs . He is in no  hurry . He has been stable. Defers FIT or cscope before physical 2021. Currently normal     Vitamin B deficiency  Stable/high. Appropriate supplementation reviewed, monitor ; cont otc     Vitamin D deficiency, unspecified  stable, stable on D3 2000 IUs daily to 4000 IUs daily during winter months. Health maintenance examination  Health maintenance issues discussed at length at today's annual wellness visit, see separate visit, 5/25/2022. Encouraged yearly      OBesity  Counseled, risk reviewed, defers formal invention.   Lifestyle education reviewed        Plan as above.  Counseled extensively and differential diagnoses relevant to above were reviewed, including serious etiologies. If relevant, instructions and  alternatives to meds/treatment reviewed, as well as interactions, and  SE's/ADRs reviewed, notify immediately if any, discontinuing new meds if any. Plan made after discussion and shared decision making. Continue per specialists. Continue current therapy. Counseled. Precautions reviewed. Otherwise simply wants blood work and follow-up 4-month sooner as needed. As long as symptoms steadily improve/resolve, and medical conditions follow the expected course, FU as below, sooner PRN. Return in about 4 months (around 9/25/2022), or if symptoms worsen or fail to improve. Over 36 minutes  spent with the patient in regard to this encounter only, in reviewing records, reviewing with patient/family, counseling, ordering,  prescribing, completing h&p, etc., with over 50% of the time spent face to face counseling. Signs and symptoms to watch for discussed, serious signs and symptoms reviewed. ER if any. Haroon Abad MD    Patients are advised to check with insurance company to ensure coverage and to fully understand benefits and cost prior to any testing. This note was created with the assistance of voice recognition software. Document was reviewed however may contain grammatical errors.

## 2022-05-25 NOTE — PATIENT INSTRUCTIONS
Personalized Preventive Plan for Nory Colin - 5/25/2022  Medicare offers a range of preventive health benefits. Some of the tests and screenings are paid in full while other may be subject to a deductible, co-insurance, and/or copay. Some of these benefits include a comprehensive review of your medical history including lifestyle, illnesses that may run in your family, and various assessments and screenings as appropriate. After reviewing your medical record and screening and assessments performed today your provider may have ordered immunizations, labs, imaging, and/or referrals for you. A list of these orders (if applicable) as well as your Preventive Care list are included within your After Visit Summary for your review. Other Preventive Recommendations:    · A preventive eye exam performed by an eye specialist is recommended every 1-2 years to screen for glaucoma; cataracts, macular degeneration, and other eye disorders. · A preventive dental visit is recommended every 6 months. · Try to get at least 150 minutes of exercise per week or 10,000 steps per day on a pedometer . · Order or download the FREE \"Exercise & Physical Activity: Your Everyday Guide\" from [employer]. Call 3-815.147.4120 or search [employer] online. · You need 4471-5265 mg of calcium and 5638-6831 IU of vitamin D per day. It is possible to meet your calcium requirement with diet alone, but a vitamin D supplement is usually necessary to meet this goal.  · When exposed to the sun, use a sunscreen that protects against both UVA and UVB radiation with an SPF of 30 or greater. Reapply every 2 to 3 hours or after sweating, drying off with a towel, or swimming. · Always wear a seat belt when traveling in a car. Always wear a helmet when riding a bicycle or motorcycle.

## 2022-05-25 NOTE — PROGRESS NOTES
Medicare Annual Wellness Visit    Laury Mccall is here for Medicare AWV    Assessment & Plan   Medicare annual wellness visit, subsequent           Assessment and Plan:   Diagnosis Orders   1. Medicare annual wellness visit, subsequent        Colon cancer screening - fit. Defers cscope unless fit abnl    No problem-specific Assessment & Plan notes found for this encounter. Plan as above. Counseled extensively and differential diagnoses relevant to above were reviewed, including serious etiologies. Side effects and interactions of medications were reviewed. Health maintenance issues discussed at length as above, 5/25/2022 . Encouraged yearly physicals. As long as symptoms steadily improve/resolve, and medical conditions follow the expected course, FU as below, as previously directed and sooner PRN. Return for Medicare Annual Wellness Visit in 1 year. Signs and symptoms to watch for discussed, serious signs and symptoms reviewed. ER if any. Walter Snell MD    Patients are advised to check with insurance company to ensure coverage and to fully understand benefits and cost prior to any testing. This note was created with the assistance of voice recognition software. Document was reviewed however may contain grammatical errors. Recommendations for Preventive Services Due: see orders and patient instructions/AVS.  Recommended screening schedule for the next 5-10 years is provided to the patient in written form: see Patient Instructions/AVS.     Return for Medicare Annual Wellness Visit in 1 year. Subjective       Patient's complete Health Risk Assessment and screening values have been reviewed and are found in Flowsheets. The following problems were reviewed today and where indicated follow up appointments were made and/or referrals ordered. Health Maintenance: Well balanced/proper diet reviewed.  Counseled on MVI, fiber, b-complex, calcium  and fish oils (including pros/cons of OTC vs Rx). Exercise recommendations reviewed. Reviewed  recommendations regarding moderna x 3, counseled on 4th;  Td (Tdap) (4/2011 - declines booster, encouraged ck with pharm), pneumovax (3/15, 9/20) agrees to prevnar  13 (3/19), flu shot (got), Hepatitis vaccines and shingles vaccine (had zostavax, had   shingrix x 2). Importance of Regular Eye and Dental exams reviewed, Risks/Benefits of ASA reviewed and discussed current recommendations. Caffeine  risks/limits and Sun protection reviewed. Notify if any new or changing moles/skin lesions etc.follows  [place]/tj browning. Dexa Scan indications/ risk factors for osteoporotic fractures and prevention reviewed. Declines. . Counseled on testicular exams, prostate exams. Colonoscopy recommendations reviewed  (4/14. non perfect prep. small area telengectasia. he recommends repeat 5-7yrs from last). Should have  done, declines for a year. Pt denies change in bowel habits or [address] of colon polyps/CA. agrees FIT cards    Indications for ekg reviewed. dasx Saw cardiologist 2017. Indications for other caridac testing incl stress, echo, event, etc, dasx. Reviewed indications  for PFT's. Also counseled on indications for imaging including Brain, carotid,  chest, Abdominal, Aortic , PVS + otherwise.  dasx  AAA screen neg 12/19      Positive Risk Factor Screenings with Interventions:               General Health and ACP:  General  In general, how would you say your health is?: Very Good  In the past 7 days, have you experienced any of the following: New or Increased Pain, New or Increased Fatigue, Loneliness, Social Isolation, Stress or Anger?: No  Do you get the social and emotional support that you need?: Yes  Do you have a Living Will?: Yes    Advance Directives     Power of  Living Will ACP-Advance Directive ACP-Power of     Not on File Not on File Not on File Not on File      General Health Risk Interventions:  · counseled    Health Habits/Nutrition:     Physical Activity: Sufficiently Active    Days of Exercise per Week: 5 days    Minutes of Exercise per Session: 60 min     Have you lost any weight without trying in the past 3 months?: No  Body mass index: (!) 33.37  Have you seen the dentist within the past year?: Yes    Health Habits/Nutrition Interventions:  · counseled             Objective   Vitals:    22 1428   BP: 134/68   Pulse: 92   Temp: 97.9 °F (36.6 °C)   SpO2: 96%   Weight: 226 lb (102.5 kg)   Height: 5' 9\" (1.753 m)      Body mass index is 33.37 kg/m². No Known Allergies  Prior to Visit Medications    Medication Sig Taking? Authorizing Provider   atorvastatin (LIPITOR) 40 MG tablet Take 1 tablet by mouth daily Yes Miranda Best MD   metFORMIN (GLUCOPHAGE) 1000 MG tablet Take 1 tablet by mouth 2 times daily (with meals) Yes Miranda Best MD   metoprolol succinate (TOPROL XL) 25 MG extended release tablet Take 1 tablet by mouth daily Yes Miranda Best MD   quinapril (ACCUPRIL) 20 MG tablet Take 1 tablet by mouth daily Yes Miranda Best MD   triamcinolone (KENALOG) 0.1 % cream Apply topically 2 times daily for no more than 2 weeks Yes Teressa Levy PA-C   diclofenac sodium (VOLTAREN) 1 % GEL 4g qid prn lower ext joints (knees,ankles,feet) max 16g/d. (spine,hip,shoulder use has not been evaluated) Yes Miranda Best MD   fenofibrate (TRICOR) 54 MG tablet   Take 54 mg by mouth daily  Yes Historical Provider, MD   HUMALOG MIX 75/25 KWIKPEN (75-25) 100 UNIT/ML SUPN injection pen 106 Units 2 times daily (with meals)  Yes Historical Provider, MD   aspirin 81 MG tablet   Take 81 mg by mouth daily Prescribed per Dr Jazzy Pacheco. Contact Dr Martinez Hidalgo preop instructions.  Yes Historical Provider, MD   vitamin B-12 (CYANOCOBALAMIN) 100 MCG tablet Take 1,000 mcg by mouth daily Yes Historical Provider, MD   vitamin D (CHOLECALCIFEROL) 1000 UNITS TABS tablet Take 2,000 Units by mouth daily Yes Historical Provider, MD [place] (Including outside providers/suppliers regularly involved in providing care):   Patient Care Team:  Ramila Thomas MD as PCP - General (Family Medicine)  Ramila Thomas MD as PCP - [facility]  Barbra Rosenthal MD as Consulting Physician (Cardiology)  Quintin Jacques MD     Reviewed and updated this visit:  Tobacco  Allergies  Meds  Med Hx  Surg Hx  Soc Hx  Fam Hx

## 2022-10-10 DIAGNOSIS — E78.2 MIXED HYPERLIPIDEMIA: ICD-10-CM

## 2022-10-10 RX ORDER — ATORVASTATIN CALCIUM 40 MG/1
40 TABLET, FILM COATED ORAL DAILY
Qty: 30 TABLET | Refills: 0 | Status: SHIPPED
Start: 2022-10-10 | End: 2022-10-14 | Stop reason: SDUPTHER

## 2022-10-11 DIAGNOSIS — E78.2 MIXED HYPERLIPIDEMIA: ICD-10-CM

## 2022-10-11 DIAGNOSIS — E53.9 VITAMIN B DEFICIENCY: ICD-10-CM

## 2022-10-11 DIAGNOSIS — E55.9 VITAMIN D DEFICIENCY, UNSPECIFIED: ICD-10-CM

## 2022-10-11 DIAGNOSIS — I10 HYPERTENSION, ESSENTIAL: ICD-10-CM

## 2022-10-11 DIAGNOSIS — E11.42 TYPE 2 DIABETES MELLITUS WITH DIABETIC POLYNEUROPATHY, WITH LONG-TERM CURRENT USE OF INSULIN (HCC): ICD-10-CM

## 2022-10-11 DIAGNOSIS — E83.52 HYPERCALCEMIA: ICD-10-CM

## 2022-10-11 DIAGNOSIS — Z79.4 TYPE 2 DIABETES MELLITUS WITH DIABETIC POLYNEUROPATHY, WITH LONG-TERM CURRENT USE OF INSULIN (HCC): ICD-10-CM

## 2022-10-11 LAB
ALBUMIN SERPL-MCNC: 4.6 G/DL (ref 3.5–5.2)
ALP BLD-CCNC: 56 U/L (ref 40–129)
ALT SERPL-CCNC: 47 U/L (ref 0–40)
ANION GAP SERPL CALCULATED.3IONS-SCNC: 13 MMOL/L (ref 7–16)
AST SERPL-CCNC: 25 U/L (ref 0–39)
BASOPHILS ABSOLUTE: 0.06 E9/L (ref 0–0.2)
BASOPHILS RELATIVE PERCENT: 0.9 % (ref 0–2)
BILIRUB SERPL-MCNC: 0.4 MG/DL (ref 0–1.2)
BILIRUBIN URINE: NEGATIVE
BLOOD, URINE: NEGATIVE
BUN BLDV-MCNC: 19 MG/DL (ref 6–23)
CALCIUM IONIZED: 1.44 MMOL/L (ref 1.15–1.33)
CALCIUM SERPL-MCNC: 10.5 MG/DL (ref 8.6–10.2)
CHLORIDE BLD-SCNC: 104 MMOL/L (ref 98–107)
CHOLESTEROL, TOTAL: 180 MG/DL (ref 0–199)
CLARITY: CLEAR
CO2: 24 MMOL/L (ref 22–29)
COLOR: YELLOW
CREAT SERPL-MCNC: 0.8 MG/DL (ref 0.7–1.2)
CREATININE URINE: 117 MG/DL (ref 40–278)
EOSINOPHILS ABSOLUTE: 0.18 E9/L (ref 0.05–0.5)
EOSINOPHILS RELATIVE PERCENT: 2.7 % (ref 0–6)
FOLATE: 12.3 NG/ML (ref 4.8–24.2)
GFR AFRICAN AMERICAN: >60
GFR NON-AFRICAN AMERICAN: >60 ML/MIN/1.73
GLUCOSE BLD-MCNC: 160 MG/DL (ref 74–99)
GLUCOSE URINE: >=1000 MG/DL
HBA1C MFR BLD: 7.6 % (ref 4–5.6)
HCT VFR BLD CALC: 45.3 % (ref 37–54)
HDLC SERPL-MCNC: 22 MG/DL
HEMOGLOBIN: 14.9 G/DL (ref 12.5–16.5)
IMMATURE GRANULOCYTES #: 0.02 E9/L
IMMATURE GRANULOCYTES %: 0.3 % (ref 0–5)
KETONES, URINE: NEGATIVE MG/DL
LDL CHOLESTEROL CALCULATED: 92 MG/DL (ref 0–99)
LEUKOCYTE ESTERASE, URINE: NEGATIVE
LYMPHOCYTES ABSOLUTE: 1.78 E9/L (ref 1.5–4)
LYMPHOCYTES RELATIVE PERCENT: 26.7 % (ref 20–42)
MCH RBC QN AUTO: 30.6 PG (ref 26–35)
MCHC RBC AUTO-ENTMCNC: 32.9 % (ref 32–34.5)
MCV RBC AUTO: 93 FL (ref 80–99.9)
MICROALBUMIN UR-MCNC: <12 MG/L
MICROALBUMIN/CREAT UR-RTO: ABNORMAL (ref 0–30)
MONOCYTES ABSOLUTE: 0.53 E9/L (ref 0.1–0.95)
MONOCYTES RELATIVE PERCENT: 8 % (ref 2–12)
NEUTROPHILS ABSOLUTE: 4.09 E9/L (ref 1.8–7.3)
NEUTROPHILS RELATIVE PERCENT: 61.4 % (ref 43–80)
NITRITE, URINE: NEGATIVE
PDW BLD-RTO: 14 FL (ref 11.5–15)
PH UA: 5.5 (ref 5–9)
PLATELET # BLD: 208 E9/L (ref 130–450)
PMV BLD AUTO: 12.9 FL (ref 7–12)
POTASSIUM SERPL-SCNC: 4.8 MMOL/L (ref 3.5–5)
PROTEIN UA: NEGATIVE MG/DL
RBC # BLD: 4.87 E12/L (ref 3.8–5.8)
SODIUM BLD-SCNC: 141 MMOL/L (ref 132–146)
SPECIFIC GRAVITY UA: >=1.03 (ref 1–1.03)
TOTAL CK: 135 U/L (ref 20–200)
TOTAL PROTEIN: 7.6 G/DL (ref 6.4–8.3)
TRIGL SERPL-MCNC: 331 MG/DL (ref 0–149)
TSH SERPL DL<=0.05 MIU/L-ACNC: 1.68 UIU/ML (ref 0.27–4.2)
UROBILINOGEN, URINE: 0.2 E.U./DL
VITAMIN B-12: >2000 PG/ML (ref 211–946)
VLDLC SERPL CALC-MCNC: 66 MG/DL
WBC # BLD: 6.7 E9/L (ref 4.5–11.5)

## 2022-10-12 LAB
PARATHYROID HORMONE INTACT: 17 PG/ML (ref 15–65)
VITAMIN D 25-HYDROXY: 45 NG/ML (ref 30–100)

## 2022-10-14 ENCOUNTER — OFFICE VISIT (OUTPATIENT)
Dept: PRIMARY CARE CLINIC | Age: 72
End: 2022-10-14
Payer: MEDICARE

## 2022-10-14 VITALS
SYSTOLIC BLOOD PRESSURE: 134 MMHG | TEMPERATURE: 97.7 F | WEIGHT: 233 LBS | BODY MASS INDEX: 34.41 KG/M2 | DIASTOLIC BLOOD PRESSURE: 70 MMHG | HEART RATE: 90 BPM | OXYGEN SATURATION: 94 %

## 2022-10-14 DIAGNOSIS — E53.9 VITAMIN B DEFICIENCY: ICD-10-CM

## 2022-10-14 DIAGNOSIS — E78.2 MIXED HYPERLIPIDEMIA: Primary | ICD-10-CM

## 2022-10-14 DIAGNOSIS — E11.42 TYPE 2 DIABETES MELLITUS WITH DIABETIC POLYNEUROPATHY, WITH LONG-TERM CURRENT USE OF INSULIN (HCC): ICD-10-CM

## 2022-10-14 DIAGNOSIS — I10 HYPERTENSION, ESSENTIAL: ICD-10-CM

## 2022-10-14 DIAGNOSIS — Z00.00 HEALTH MAINTENANCE EXAMINATION: ICD-10-CM

## 2022-10-14 DIAGNOSIS — D07.5 CARCINOMA IN SITU OF PROSTATE: ICD-10-CM

## 2022-10-14 DIAGNOSIS — E83.52 HYPERCALCEMIA: ICD-10-CM

## 2022-10-14 DIAGNOSIS — E55.9 VITAMIN D DEFICIENCY, UNSPECIFIED: ICD-10-CM

## 2022-10-14 DIAGNOSIS — Z79.4 TYPE 2 DIABETES MELLITUS WITH DIABETIC POLYNEUROPATHY, WITH LONG-TERM CURRENT USE OF INSULIN (HCC): ICD-10-CM

## 2022-10-14 DIAGNOSIS — Z23 ENCOUNTER FOR IMMUNIZATION: ICD-10-CM

## 2022-10-14 DIAGNOSIS — R79.89 ELEVATED LFTS: ICD-10-CM

## 2022-10-14 PROCEDURE — G0008 ADMIN INFLUENZA VIRUS VAC: HCPCS | Performed by: FAMILY MEDICINE

## 2022-10-14 PROCEDURE — 1123F ACP DISCUSS/DSCN MKR DOCD: CPT | Performed by: FAMILY MEDICINE

## 2022-10-14 PROCEDURE — 90694 VACC AIIV4 NO PRSRV 0.5ML IM: CPT | Performed by: FAMILY MEDICINE

## 2022-10-14 PROCEDURE — 99214 OFFICE O/P EST MOD 30 MIN: CPT | Performed by: FAMILY MEDICINE

## 2022-10-14 PROCEDURE — 3051F HG A1C>EQUAL 7.0%<8.0%: CPT | Performed by: FAMILY MEDICINE

## 2022-10-14 RX ORDER — ATORVASTATIN CALCIUM 40 MG/1
40 TABLET, FILM COATED ORAL DAILY
Qty: 90 TABLET | Refills: 3 | Status: SHIPPED | OUTPATIENT
Start: 2022-10-14

## 2022-10-14 RX ORDER — METOPROLOL SUCCINATE 25 MG/1
25 TABLET, EXTENDED RELEASE ORAL DAILY
Qty: 90 TABLET | Refills: 3 | Status: SHIPPED | OUTPATIENT
Start: 2022-10-14

## 2022-10-14 RX ORDER — QUINAPRIL 20 MG/1
20 TABLET ORAL DAILY
Qty: 90 TABLET | Refills: 3 | Status: SHIPPED | OUTPATIENT
Start: 2022-10-14

## 2022-10-14 SDOH — ECONOMIC STABILITY: FOOD INSECURITY: WITHIN THE PAST 12 MONTHS, YOU WORRIED THAT YOUR FOOD WOULD RUN OUT BEFORE YOU GOT MONEY TO BUY MORE.: NEVER TRUE

## 2022-10-14 SDOH — ECONOMIC STABILITY: FOOD INSECURITY: WITHIN THE PAST 12 MONTHS, THE FOOD YOU BOUGHT JUST DIDN'T LAST AND YOU DIDN'T HAVE MONEY TO GET MORE.: NEVER TRUE

## 2022-10-14 ASSESSMENT — SOCIAL DETERMINANTS OF HEALTH (SDOH): HOW HARD IS IT FOR YOU TO PAY FOR THE VERY BASICS LIKE FOOD, HOUSING, MEDICAL CARE, AND HEATING?: NOT HARD AT ALL

## 2022-10-14 NOTE — PROGRESS NOTES
Gita Ann : 1950 Sex: male  Age: 67 y.o. Chief Complaint   Patient presents with    Discuss Labs    Other     Discuss supplements        HPI:    Patient presents today to follow-up on multiple medical conditions. Just spent a month in [place], indulging in blames the numbers on this. Had a wonderful time.     Blood work reviewed, sodium potassium normal CO2 normal ionized calcium elevated but relatively stable at 1.44, total 10.5 glucose 621 Y48 elevated folic acid normal HDL 22 LDL 92 triglyceride 331 they will jarrett up with weight, 47 PTH 17 TSH 1.6 CBC grossly normal differential reviewed urinalysis negative microalbumin creatinine ratio-NC    Most Recent Labs  CBC  Lab Results   Component Value Date/Time    WBC 6.7 10/11/2022 08:09 AM    WBC 7.2 2022 10:25 AM    WBC 7.5 09/10/2021 08:53 AM    RBC 4.87 10/11/2022 08:09 AM    RBC 4.97 2022 10:25 AM    RBC 4.95 09/10/2021 08:53 AM    HGB 14.9 10/11/2022 08:09 AM    HGB 14.8 2022 10:25 AM    HGB 14.5 09/10/2021 08:53 AM    HCT 45.3 10/11/2022 08:09 AM    HCT 44.7 2022 10:25 AM    HCT 44.5 09/10/2021 08:53 AM    MCV 93.0 10/11/2022 08:09 AM    MCV 89.9 2022 10:25 AM    MCV 89.9 09/10/2021 08:53 AM     10/11/2022 08:09 AM     2022 10:25 AM     09/10/2021 08:53 AM      CMP  Lab Results   Component Value Date/Time     10/11/2022 08:09 AM     2022 10:25 AM     09/10/2021 08:53 AM    K 4.8 10/11/2022 08:09 AM    K 5.1 2022 10:25 AM    K 5.0 09/10/2021 08:53 AM     10/11/2022 08:09 AM     2022 10:25 AM     09/10/2021 08:53 AM    CO2 24 10/11/2022 08:09 AM    CO2 26 2022 10:25 AM    CO2 23 09/10/2021 08:53 AM    ANIONGAP 13 10/11/2022 08:09 AM    ANIONGAP 12 2022 10:25 AM    ANIONGAP 17 09/10/2021 08:53 AM    GLUCOSE 160 10/11/2022 08:09 AM    GLUCOSE 148 2022 10:25 AM    GLUCOSE 79 09/10/2021 08:53 AM    BUN 19 10/11/2022 08:09 AM BUN 19 05/24/2022 10:25 AM    BUN 14 09/10/2021 08:53 AM    CREATININE 0.8 10/11/2022 08:09 AM    CREATININE 0.9 05/24/2022 10:25 AM    CREATININE 0.8 09/10/2021 08:53 AM    LABGLOM >60 10/11/2022 08:09 AM    LABGLOM >60 05/24/2022 10:25 AM    LABGLOM >60 09/10/2021 08:53 AM    GFRAA >60 10/11/2022 08:09 AM    GFRAA >60 05/24/2022 10:25 AM    GFRAA >60 09/10/2021 08:53 AM    CALCIUM 10.5 10/11/2022 08:09 AM    CALCIUM 10.2 05/24/2022 10:25 AM    CALCIUM 10.8 09/10/2021 08:53 AM    PROT 7.6 10/11/2022 08:09 AM    PROT 7.4 05/24/2022 10:25 AM    PROT 7.9 09/10/2021 08:53 AM    LABALBU 4.6 10/11/2022 08:09 AM    LABALBU 4.4 05/24/2022 10:25 AM    LABALBU 4.6 09/10/2021 08:53 AM    BILITOT 0.4 10/11/2022 08:09 AM    BILITOT 0.5 05/24/2022 10:25 AM    BILITOT 0.5 09/10/2021 08:53 AM    ALKPHOS 56 10/11/2022 08:09 AM    ALKPHOS 52 05/24/2022 10:25 AM    ALKPHOS 56 09/10/2021 08:53 AM    AST 25 10/11/2022 08:09 AM    AST 26 05/24/2022 10:25 AM    AST 23 09/10/2021 08:53 AM    ALT 47 10/11/2022 08:09 AM    ALT 39 05/24/2022 10:25 AM    ALT 28 09/10/2021 08:53 AM     A1C  Lab Results   Component Value Date/Time    LABA1C 7.6 10/11/2022 08:09 AM    LABA1C 7.5 05/24/2022 10:25 AM    LABA1C 7.1 12/14/2021 12:00 AM     TSH  Lab Results   Component Value Date/Time    TSH 1.680 10/11/2022 08:09 AM    TSH 1.510 05/24/2022 10:25 AM    TSH 1.690 09/10/2021 08:53 AM     FREET4  No results found for: [id number]  LIPID  Lab Results   Component Value Date/Time    CHOL 180 10/11/2022 08:09 AM    CHOL 112 05/24/2022 10:25 AM    CHOL 99 09/10/2021 08:53 AM    HDL 22 10/11/2022 08:09 AM    HDL 20 05/24/2022 10:25 AM    HDL 22 09/10/2021 08:53 AM    LDLCALC 92 10/11/2022 08:09 AM    LDLCALC 56 05/24/2022 10:25 AM    LDLCALC 48 09/10/2021 08:53 AM    TRIG 331 10/11/2022 08:09 AM    TRIG 180 05/24/2022 10:25 AM    TRIG 145 09/10/2021 08:53 AM    CHOLHDLRATIO 4.4 02/13/2021 12:00 AM    CHOLHDLRATIO 4.6 05/29/2020 12:00 AM    CHOLHDLRATIO 5.7 11/23/2019 12:00 AM     VITAMIN D  Lab Results   Component Value Date/Time    VITD25 45 10/11/2022 08:09 AM    VITD25 45 05/24/2022 10:25 AM    VITD25 33 09/10/2021 08:53 AM     MAGNESIUM  No results found for: MG   PHOS  No results found for: PHOS   MORALES   No results found for: MORALES  RHEUMATOID FACTOR  No results found for: RF  PSA  Lab Results   Component Value Date/Time    PSA <0.03 09/10/2021 08:53 AM    PSA  02/13/2021 12:00 AM      Comment:      <0.1    PSA <0.03 10/14/2020 08:56 AM      HEPATITIS C  Lab Results   Component Value Date/Time    HCVABI Non-Reactive 10/14/2020 08:56 AM     HIV  No results found for: IBX8XIQ, HIV1QT  UA  Lab Results   Component Value Date/Time    COLORU Yellow 10/11/2022 08:11 AM    COLORU Yellow 05/24/2022 10:24 AM    COLORU Yellow 06/14/2021 09:27 AM    CLARITYU Clear 10/11/2022 08:11 AM    CLARITYU Clear 05/24/2022 10:24 AM    CLARITYU Clear 06/14/2021 09:27 AM    GLUCOSEU >=1000 10/11/2022 08:11 AM    GLUCOSEU Negative 05/24/2022 10:24 AM    GLUCOSEU Negative 06/14/2021 09:27 AM    BILIRUBINUR Negative 10/11/2022 08:11 AM    BILIRUBINUR Negative 05/24/2022 10:24 AM    BILIRUBINUR Negative 06/14/2021 09:27 AM    BILIRUBINUR Negative 02/13/2021 12:00 AM    BILIRUBINUR Negative 05/29/2020 12:00 AM    BILIRUBINUR Negative 11/23/2019 12:00 AM    KETUA Negative 10/11/2022 08:11 AM    KETUA Negative 05/24/2022 10:24 AM    KETUA Negative 06/14/2021 09:27 AM    SPECGRAV >=1.030 10/11/2022 08:11 AM    SPECGRAV >=1.030 05/24/2022 10:24 AM    SPECGRAV >=1.030 06/14/2021 09:27 AM    BLOODU Negative 10/11/2022 08:11 AM    BLOODU Negative 05/24/2022 10:24 AM    BLOODU Negative 06/14/2021 09:27 AM    PHUR 5.5 10/11/2022 08:11 AM    PHUR 5.5 05/24/2022 10:24 AM    PHUR 5.5 06/14/2021 09:27 AM    PROTEINU Negative 10/11/2022 08:11 AM    PROTEINU Negative 05/24/2022 10:24 AM    PROTEINU Negative 06/14/2021 09:27 AM    UROBILINOGEN 0.2 10/11/2022 08:11 AM    UROBILINOGEN 0.2 05/24/2022 10:24 AM UROBILINOGEN 0.2 06/14/2021 09:27 AM    NITRU Negative 10/11/2022 08:11 AM    NITRU Negative 05/24/2022 10:24 AM    NITRU Negative 06/14/2021 09:27 AM    LEUKOCYTESUR Negative 10/11/2022 08:11 AM    LEUKOCYTESUR Negative 05/24/2022 10:24 AM    LEUKOCYTESUR Negative 06/14/2021 09:27 AM     Urine Micro/Albumin Ratio  Lab Results   Component Value Date/Time    MALBCR - 10/11/2022 08:11 AM    MALBCR - 05/24/2022 10:24 AM    MALBCR - 06/14/2021 09:27 AM             ROS:  Const: Denies chills, fever, malaise and sweats. Eyes: Chronic Eye sxs  ENMT: Denies earaches, other ear symptoms other than chronic hearing loss and right-sided tinnitus. . Denies nasal or sinus symptoms other than stated  above. Denies mouth and tongue lesions and sore throat. CV: Denies chest discomfort, pain; diaphoresis, dizziness, edema, lightheadedness, orthopnea,  palpitations, syncope and near syncopal episode or any exertional symptoms  Resp: Denies cough, hemoptysis, pleuritic pain, SOB, sputum production and wheezing. GI: Denies abdominal pain, change in bowel habits, hematochezia, melena, nausea and vomiting. : Denies urinary symptoms including dysuria , urgency, frequency or hematuria. Musculo: No acute symptoms  Skin: Denies bruising and rash.   Neuro: Denies headache, numbness, stiff neck, tingling and focal weakness slurred speech or facial  droop, chronic neuropathy  Hema/Lymph: Denies bleeding/bruising tendency and enlarged lymph nodes        Current Outpatient Medications:     atorvastatin (LIPITOR) 40 MG tablet, Take 1 tablet by mouth daily, Disp: 90 tablet, Rfl: 3    metoprolol succinate (TOPROL XL) 25 MG extended release tablet, Take 1 tablet by mouth daily, Disp: 90 tablet, Rfl: 3    quinapril (ACCUPRIL) 20 MG tablet, Take 1 tablet by mouth daily, Disp: 90 tablet, Rfl: 3    metFORMIN (GLUCOPHAGE) 1000 MG tablet, Take 1 tablet by mouth 2 times daily (with meals), Disp: 180 tablet, Rfl: 3    triamcinolone (KENALOG) 0.1 % cream, Apply topically 2 times daily for no more than 2 weeks, Disp: 15 g, Rfl: 1    diclofenac sodium (VOLTAREN) 1 % GEL, 4g qid prn lower ext joints (knees,ankles,feet) max 16g/d. (spine,hip,shoulder use has not been evaluated), Disp: 1 Tube, Rfl: 3    HUMALOG MIX 75/25 KWIKPEN (75-25) 100 UNIT/ML SUPN injection pen, 106 Units 2 times daily (with meals) , Disp: , Rfl:     aspirin 81 MG tablet,  Take 81 mg by mouth daily Prescribed per Dr Ryan Oscar. Contact Dr Shen Joseph preop instructions. , Disp: , Rfl:     vitamin B-12 (CYANOCOBALAMIN) 100 MCG tablet, Take 1,000 mcg by mouth daily, Disp: , Rfl:     vitamin D (CHOLECALCIFEROL) 1000 UNITS TABS tablet, Take 2,000 Units by mouth daily, Disp: , Rfl:   No Known Allergies    Past Medical History:   Diagnosis Date    Anemia     Bleeding of eye     behind eye going to have surgery 2015    Hyperlipidemia     Hypertension     Myalgia     Prostate cancer [facility])     Prostate enlargement     follows Dr Fred Santoyo heart beat 2015    follows with Dr Bradley Abreu    Type 2 diabetes mellitus without complication ([facility] [address]     Type II or unspecified type diabetes mellitus without mention of complication, not stated as uncontrolled     Vitamin D deficiency     Vitreous hemorrhage, right eye ([address]      Past Surgical History:   Procedure Laterality Date    CLEFT PALATE REPAIR      EYE SURGERY Bilateral     cataract surgery     KNEE SURGERY Right     OTHER SURGICAL HISTORY Right 2015    pars planta virectomy with par pan retinal photocoagulation    ROTATOR CUFF REPAIR Right      Family History   Problem Relation Age of Onset    Diabetes Mother     Heart Disease Mother     Diabetes Father     Heart Disease Father      Social History     Tobacco Use    Smoking status: Former     Packs/day: 2.00     Years: 13.00     Pack years: 26.00     Types: Cigarettes     Quit date: 1984     Years since quittin.8    Smokeless tobacco: Never   Vaping Use    Vaping Use: Never used   Substance Use Topics    Alcohol use: No    Drug use: No      Social History     Social History Narrative    PMH:    Past Medical History: diabetes mellitus type II insulin requiring, hyperlipidemia, hypertension, Peyronie's    disease, prostate cancer, hypercalcemia, vitreous hemmorage B/L            Family Medical History: Dad  of diabetes complications at age 67. He had two myocardial infarction,    first at age 58 and 4-5 cerebrovascular accidents afterwards. Mom did not have coronary artery disease    that he is aware of but did have diabetes and  at the age of 80 of diabetic complications. Sister     at age 62 of diabetic complications, \"did not take care of herself\", had significant obesity, renal failure and    was on dialysis. He states mom had peripheral vascular disease and gangrene which lead to renal    failure and a cascade of events. Surgical Hx:    Rotator Cuff - RT Early     B/L Cataract    Vitreous Hemmorage B/L - SCAR TISSUE LEFT - BLIND    RIGHT EYE DOING OK. MULTIPLE SURGERIES ON BOTH    Left Shoulder - Rotator Cuff Dr Sebastian Bullock     SH: Marital: , With 2 Children, Legal Status: . Personal Habits: Cigarette Use: Former Cigarette Smoker - Quit . Alcohol: Occasionally    consumes alcohol. Exercise Type: Walks 4 times a week, Employed Through [employer] -    Retired. .    Reviewed and updated. Vitals:    10/14/22 1423   BP: 134/70   Pulse: 90   Temp: 97.7 °F (36.5 °C)   SpO2: 94%   Weight: 233 lb (105.7 kg)      Wt Readings from Last 3 Encounters:   10/14/22 233 lb (105.7 kg)   22 226 lb (102.5 kg)   22 226 lb (102.5 kg)          Exam:  Const: Appears comfortable. No signs of acute distress present. Head/Face: Atraumatic on inspection. Eyes: EOMI in both eyes. PERRL. No injection  ENMT: Auditory canals normal. Tympanic membranes: intact and translucent. External nose WNL. Nasal mucosa is boggy oropharynx: No erythema or exudate. Posterior pharynx is watery postnasal drainage, chronic left  Neck: Supple. Palpation reveals no adenopathy. No masses appreciated. No JVD. Carotids: no  bruits. Resp: Respirations are unlabored. Clear to auscultation. No rales, rhonchi or wheezes appreciated  over the lungs bilaterally. CV: Rate is regular. Rhythm is regular. No gallop or rubs. No heart murmur appreciated. Extremities: No clubbing, cyanosis, or edema. No calf inflammation or tenderness. Abdomen: Bowel sounds are normoactive. Abdomen is soft, nontender, and nondistended. No  abdominal masses. No palpable hepatosplenomegaly. Lymph: No palpable or visible regional lymphadenopathy. Musculoskeletal: no acute joint inflammation. Skin: Dry and warm with no rash. Skin normal to inspection and palpation overall. Neuro: Alert and oriented. Affect: appropriate. Upper Extremities: 5/5 bilaterally. Lower Extremities:  5/5 bilaterally. Sensation grossly intact, dull to light touch reflexes: DTR's are symmetric and 2+ bilaterally. .  Cranial Nerves: Cranial nerves grossly intact. Assessment and Plan:   Diagnosis Orders   1. Mixed hyperlipidemia  atorvastatin (LIPITOR) 40 MG tablet    CBC with Auto Differential    CK    Comprehensive Metabolic Panel    Lipid Panel      2. Hypertension, essential  metoprolol succinate (TOPROL XL) 25 MG extended release tablet    quinapril (ACCUPRIL) 20 MG tablet      3. Type 2 diabetes mellitus with diabetic polyneuropathy, with long-term current use of insulin (HCC)  quinapril (ACCUPRIL) 20 MG tablet    CBC with Auto Differential    Hemoglobin A1C    Urinalysis    Microalbumin / Creatinine Urine Ratio    TSH      4. Encounter for immunization  Influenza, FLUAD, (age 72 y+), IM, Preservative Free, 0.5 mL      5. Carcinoma in situ of prostate  PSA, Diagnostic      6. Vitamin D deficiency, unspecified  Vitamin D 25 Hydroxy      7. Vitamin B deficiency  Vitamin B12 & Folate      8. Health maintenance examination        9. Hypercalcemia  Calcium, Ionized    PTH, Intact      10. Elevated LFTs            No problem-specific Assessment & Plan notes found for this encounter. Hypercalcemia   Flucates, . PTH normal.  Signs and symptoms to watch for discussed. Serious signs and symptoms  reviewed. ER if any. Has been somewhat labile. He is asymptomatic. He will follow  with Dr. Sonal Gandhi. Potential seriousness reviewed. Defers additional screening  or repeat bone density       Neuropathy  Counseled extensively. Differential reviewed, including serious etiologies. Consider gabapentin but declines now. He will think about it. Defers EMG nerve conduction study. Type 2 diabetes mellitus with complication (HCC)  Uncontrolled, following with Dr. Sonal Gandhi. Watch BS closely ambulatory. Parameters given. macro  and microvascular complications reviewed. Call if not in range. ER if dangerous  numbers. hyper and hypoglycemic precautions reviewed. con't per Dr Soanl Gandhi. Discussed regular eye exams, daily foot examinations. on high  dose insulin. risk of hypoglycemia and tx reviewed. On quinapril, risks benefits  reviewed   hemoglobin A1c trending down 7.1-7.3-7.5-7.6, continue lifestyle modification, defers to Dr. Sonal Gandhi     Mixed hyperlipidemia  Counseled, on atorvastatin 40 mg daily with standard precautions. After discussion, he wants to continue current therapy. lifestyle modification reviewed. risk of  hyperlipidemia reviewed tolerating therapy. Niaspan and fenofibrate was discontinued, Niaspan because of cost. Could consider  fenofibrate if triglycerides mandate , mildly elevated, HDL chronically low. goals reviewed. Would like to continue current tx. Hypertension, essential  Stable. Watch ambulatory, parameters given. If out of range, notify. If dangerous numbers,  directly to ER. Risk of HTN reviewed. lifestyle modification emphasized. Risks of  hypertension and hypotension reviewed. Tolerating therapy. Tolerating Toprol.   And quinapril. Carcinoma in situ of prostate  Continue per specialists. , Chasity . I recommended he  follow-up with urology still with history of prostate cancer, history of seeds. No longer  seeing Dr. Aimee Licona . Would like us to monitor PSA. PSA 9/21 is less than 0.03 check PSA next visit     Anemia  chronic, stable, normal now. Counseled extensively. Differential reviewed, including  serious etiologies. Declines further evaluation/treatment. Had colonoscopy 4/14 dr Martha Pate; small patch of telengectasia. recommended repeat 5-7 yrs . He is in no  hurry . He has been stable. Defers FIT or cscope before physical 2021. Currently normal     Vitamin B deficiency  Stable/high. Appropriate supplementation reviewed, monitor ; cont otc     Vitamin D deficiency, unspecified  stable, stable on D3 2000 IUs daily to 4000 IUs daily during winter months. Health maintenance examination  Health maintenance issues discussed at length   5/25/2022. Encouraged yearly      OBesity  Counseled, risk reviewed, defers formal invention. Lifestyle education reviewed    Elevated ALT   Likely fatty liver. Precautions reviewed. Risks of even fatty liver l reviewed. Lifestyle modification and appropriate diet and weight loss reviewed. Other than basic monitoring defers in-depth blood work imaging or otherwise. ASX. Plan as above. Counseled extensively and differential diagnoses relevant to above were reviewed, including serious etiologies. If relevant, instructions and  alternatives to meds/treatment reviewed, as well as interactions, and  SE's/ADRs reviewed, notify immediately if any, discontinuing new meds if any. Plan made after discussion and shared decision making. Continue per specialists. Continue current therapy. Refills given. Flu vaccine today. He is going to go to the pharmacy in the future for COVID booster.   Defers blood work and follow-up for 4-month sooner as needed      As long as symptoms

## 2022-11-14 DIAGNOSIS — Z79.4 TYPE 2 DIABETES MELLITUS WITH DIABETIC POLYNEUROPATHY, WITH LONG-TERM CURRENT USE OF INSULIN (HCC): ICD-10-CM

## 2022-11-14 DIAGNOSIS — E11.42 TYPE 2 DIABETES MELLITUS WITH DIABETIC POLYNEUROPATHY, WITH LONG-TERM CURRENT USE OF INSULIN (HCC): ICD-10-CM

## 2023-02-03 ENCOUNTER — TELEPHONE (OUTPATIENT)
Dept: PRIMARY CARE CLINIC | Age: 73
End: 2023-02-03

## 2023-02-03 NOTE — TELEPHONE ENCOUNTER
There is no direct equivalent. I would need to have a further discussion with the patient to discuss options and future plan. Can he at least do a video Monday, does have enough until then? Can he check other pharmacies?

## 2023-02-03 NOTE — TELEPHONE ENCOUNTER
Patient notified  Has 2 weeks of medication left  Moved follow up appointment to next week to discuss options further

## 2023-02-03 NOTE — TELEPHONE ENCOUNTER
Pharm calling stating pt Quinapril is not available and on back order for all strengths.  Pharm asking if you can change med

## 2023-02-06 DIAGNOSIS — E11.42 TYPE 2 DIABETES MELLITUS WITH DIABETIC POLYNEUROPATHY, WITH LONG-TERM CURRENT USE OF INSULIN (HCC): ICD-10-CM

## 2023-02-06 DIAGNOSIS — E55.9 VITAMIN D DEFICIENCY, UNSPECIFIED: ICD-10-CM

## 2023-02-06 DIAGNOSIS — E53.9 VITAMIN B DEFICIENCY: ICD-10-CM

## 2023-02-06 DIAGNOSIS — Z79.4 TYPE 2 DIABETES MELLITUS WITH DIABETIC POLYNEUROPATHY, WITH LONG-TERM CURRENT USE OF INSULIN (HCC): ICD-10-CM

## 2023-02-06 DIAGNOSIS — E83.52 HYPERCALCEMIA: ICD-10-CM

## 2023-02-06 DIAGNOSIS — D07.5 CARCINOMA IN SITU OF PROSTATE: ICD-10-CM

## 2023-02-06 DIAGNOSIS — E78.2 MIXED HYPERLIPIDEMIA: ICD-10-CM

## 2023-02-06 LAB
ALBUMIN SERPL-MCNC: 4.6 G/DL (ref 3.5–5.2)
ALP BLD-CCNC: 51 U/L (ref 40–129)
ALT SERPL-CCNC: 45 U/L (ref 0–40)
ANION GAP SERPL CALCULATED.3IONS-SCNC: 11 MMOL/L (ref 7–16)
AST SERPL-CCNC: 32 U/L (ref 0–39)
BASOPHILS ABSOLUTE: 0.07 E9/L (ref 0–0.2)
BASOPHILS RELATIVE PERCENT: 1 % (ref 0–2)
BILIRUB SERPL-MCNC: 0.6 MG/DL (ref 0–1.2)
BILIRUBIN URINE: NEGATIVE
BLOOD, URINE: NEGATIVE
BUN BLDV-MCNC: 18 MG/DL (ref 6–23)
CALCIUM IONIZED: 1.51 MMOL/L (ref 1.15–1.33)
CALCIUM SERPL-MCNC: 10.3 MG/DL (ref 8.6–10.2)
CHLORIDE BLD-SCNC: 104 MMOL/L (ref 98–107)
CHOLESTEROL, TOTAL: 108 MG/DL (ref 0–199)
CLARITY: CLEAR
CO2: 27 MMOL/L (ref 22–29)
COLOR: YELLOW
CREAT SERPL-MCNC: 0.9 MG/DL (ref 0.7–1.2)
CREATININE URINE: 65 MG/DL (ref 40–278)
EOSINOPHILS ABSOLUTE: 0.15 E9/L (ref 0.05–0.5)
EOSINOPHILS RELATIVE PERCENT: 2.2 % (ref 0–6)
FOLATE: 11.6 NG/ML (ref 4.8–24.2)
GFR SERPL CREATININE-BSD FRML MDRD: >60 ML/MIN/1.73
GLUCOSE BLD-MCNC: 119 MG/DL (ref 74–99)
GLUCOSE URINE: >=1000 MG/DL
HBA1C MFR BLD: 7.1 % (ref 4–5.6)
HCT VFR BLD CALC: 46 % (ref 37–54)
HDLC SERPL-MCNC: 18 MG/DL
HEMOGLOBIN: 14.9 G/DL (ref 12.5–16.5)
IMMATURE GRANULOCYTES #: 0.03 E9/L
IMMATURE GRANULOCYTES %: 0.4 % (ref 0–5)
KETONES, URINE: NEGATIVE MG/DL
LDL CHOLESTEROL CALCULATED: 53 MG/DL (ref 0–99)
LEUKOCYTE ESTERASE, URINE: NEGATIVE
LYMPHOCYTES ABSOLUTE: 1.98 E9/L (ref 1.5–4)
LYMPHOCYTES RELATIVE PERCENT: 29.2 % (ref 20–42)
MCH RBC QN AUTO: 30 PG (ref 26–35)
MCHC RBC AUTO-ENTMCNC: 32.4 % (ref 32–34.5)
MCV RBC AUTO: 92.7 FL (ref 80–99.9)
MICROALBUMIN UR-MCNC: <12 MG/L
MICROALBUMIN/CREAT UR-RTO: ABNORMAL (ref 0–30)
MONOCYTES ABSOLUTE: 0.52 E9/L (ref 0.1–0.95)
MONOCYTES RELATIVE PERCENT: 7.7 % (ref 2–12)
NEUTROPHILS ABSOLUTE: 4.03 E9/L (ref 1.8–7.3)
NEUTROPHILS RELATIVE PERCENT: 59.5 % (ref 43–80)
NITRITE, URINE: NEGATIVE
PARATHYROID HORMONE INTACT: 25 PG/ML (ref 15–65)
PDW BLD-RTO: 14.1 FL (ref 11.5–15)
PH UA: 5.5 (ref 5–9)
PLATELET # BLD: 235 E9/L (ref 130–450)
PMV BLD AUTO: 12.9 FL (ref 7–12)
POTASSIUM SERPL-SCNC: 4.5 MMOL/L (ref 3.5–5)
PROSTATE SPECIFIC ANTIGEN: <0.01 NG/ML (ref 0–4)
PROTEIN UA: NEGATIVE MG/DL
RBC # BLD: 4.96 E12/L (ref 3.8–5.8)
SODIUM BLD-SCNC: 142 MMOL/L (ref 132–146)
SPECIFIC GRAVITY UA: 1.02 (ref 1–1.03)
TOTAL CK: 291 U/L (ref 20–200)
TOTAL PROTEIN: 7.9 G/DL (ref 6.4–8.3)
TRIGL SERPL-MCNC: 184 MG/DL (ref 0–149)
TSH SERPL DL<=0.05 MIU/L-ACNC: 2.17 UIU/ML (ref 0.27–4.2)
UROBILINOGEN, URINE: 0.2 E.U./DL
VITAMIN B-12: >2000 PG/ML (ref 211–946)
VITAMIN D 25-HYDROXY: 54 NG/ML (ref 30–100)
VLDLC SERPL CALC-MCNC: 37 MG/DL
WBC # BLD: 6.8 E9/L (ref 4.5–11.5)

## 2023-02-09 ENCOUNTER — OFFICE VISIT (OUTPATIENT)
Dept: PRIMARY CARE CLINIC | Age: 73
End: 2023-02-09
Payer: MEDICARE

## 2023-02-09 ENCOUNTER — TELEPHONE (OUTPATIENT)
Dept: PRIMARY CARE CLINIC | Age: 73
End: 2023-02-09

## 2023-02-09 VITALS
OXYGEN SATURATION: 93 % | TEMPERATURE: 97.6 F | HEART RATE: 96 BPM | WEIGHT: 232 LBS | DIASTOLIC BLOOD PRESSURE: 62 MMHG | SYSTOLIC BLOOD PRESSURE: 128 MMHG | BODY MASS INDEX: 34.26 KG/M2

## 2023-02-09 DIAGNOSIS — E83.52 HYPERCALCEMIA: ICD-10-CM

## 2023-02-09 DIAGNOSIS — I10 HYPERTENSION, ESSENTIAL: ICD-10-CM

## 2023-02-09 DIAGNOSIS — I10 HYPERTENSION, ESSENTIAL: Primary | ICD-10-CM

## 2023-02-09 DIAGNOSIS — E55.9 VITAMIN D DEFICIENCY, UNSPECIFIED: ICD-10-CM

## 2023-02-09 DIAGNOSIS — Z00.00 HEALTH MAINTENANCE EXAMINATION: ICD-10-CM

## 2023-02-09 DIAGNOSIS — E11.42 TYPE 2 DIABETES MELLITUS WITH DIABETIC POLYNEUROPATHY, WITH LONG-TERM CURRENT USE OF INSULIN (HCC): Primary | ICD-10-CM

## 2023-02-09 DIAGNOSIS — D07.5 CARCINOMA IN SITU OF PROSTATE: ICD-10-CM

## 2023-02-09 DIAGNOSIS — E78.2 MIXED HYPERLIPIDEMIA: ICD-10-CM

## 2023-02-09 DIAGNOSIS — E53.9 VITAMIN B DEFICIENCY: ICD-10-CM

## 2023-02-09 DIAGNOSIS — R79.89 ELEVATED LFTS: ICD-10-CM

## 2023-02-09 DIAGNOSIS — Z79.4 TYPE 2 DIABETES MELLITUS WITH DIABETIC POLYNEUROPATHY, WITH LONG-TERM CURRENT USE OF INSULIN (HCC): Primary | ICD-10-CM

## 2023-02-09 PROCEDURE — 99214 OFFICE O/P EST MOD 30 MIN: CPT | Performed by: FAMILY MEDICINE

## 2023-02-09 PROCEDURE — 3074F SYST BP LT 130 MM HG: CPT | Performed by: FAMILY MEDICINE

## 2023-02-09 PROCEDURE — 3051F HG A1C>EQUAL 7.0%<8.0%: CPT | Performed by: FAMILY MEDICINE

## 2023-02-09 PROCEDURE — 1123F ACP DISCUSS/DSCN MKR DOCD: CPT | Performed by: FAMILY MEDICINE

## 2023-02-09 PROCEDURE — 3078F DIAST BP <80 MM HG: CPT | Performed by: FAMILY MEDICINE

## 2023-02-09 RX ORDER — DAPAGLIFLOZIN 10 MG/1
TABLET, FILM COATED ORAL
COMMUNITY
Start: 2023-01-16

## 2023-02-09 RX ORDER — FLASH GLUCOSE SENSOR
KIT MISCELLANEOUS
COMMUNITY
Start: 2023-02-07

## 2023-02-09 SDOH — ECONOMIC STABILITY: FOOD INSECURITY: WITHIN THE PAST 12 MONTHS, YOU WORRIED THAT YOUR FOOD WOULD RUN OUT BEFORE YOU GOT MONEY TO BUY MORE.: NEVER TRUE

## 2023-02-09 SDOH — ECONOMIC STABILITY: HOUSING INSECURITY
IN THE LAST 12 MONTHS, WAS THERE A TIME WHEN YOU DID NOT HAVE A STEADY PLACE TO SLEEP OR SLEPT IN A SHELTER (INCLUDING NOW)?: NO

## 2023-02-09 SDOH — ECONOMIC STABILITY: INCOME INSECURITY: HOW HARD IS IT FOR YOU TO PAY FOR THE VERY BASICS LIKE FOOD, HOUSING, MEDICAL CARE, AND HEATING?: NOT HARD AT ALL

## 2023-02-09 SDOH — ECONOMIC STABILITY: FOOD INSECURITY: WITHIN THE PAST 12 MONTHS, THE FOOD YOU BOUGHT JUST DIDN'T LAST AND YOU DIDN'T HAVE MONEY TO GET MORE.: NEVER TRUE

## 2023-02-09 ASSESSMENT — PATIENT HEALTH QUESTIONNAIRE - PHQ9
SUM OF ALL RESPONSES TO PHQ QUESTIONS 1-9: 0
2. FEELING DOWN, DEPRESSED OR HOPELESS: 0
SUM OF ALL RESPONSES TO PHQ QUESTIONS 1-9: 0
1. LITTLE INTEREST OR PLEASURE IN DOING THINGS: 0
SUM OF ALL RESPONSES TO PHQ9 QUESTIONS 1 & 2: 0

## 2023-02-09 NOTE — TELEPHONE ENCOUNTER
Pharm calling stating Quinapril is on backorder and unknown when available.  Pharm asking for med change

## 2023-02-09 NOTE — PROGRESS NOTES
Nabeel Zapata : 1950 Sex: male  Age: 67 y.o. Chief Complaint   Patient presents with    Discuss Labs       HPI:    Patient presents today to follow-up on multiple medical conditions. Working with dietitian, numbers have improved.   Was quite ill over December with influenza but resolved      Ionized calcium elevated but relatively stable 1.51 total 10.3 glucose 119 B12 greater than 2730 folic acid 78.3 CK2 847 asymptomatic HDL 18 which is chronically low LDL down to 53 triglyceride 184 ALT 45 LFTs otherwise normal he states a few weeks ago his hemoglobin A1c was 7.6 please been diligently watching now 7.1 PTH 25 TSH 2.1 vitamin D 54 CBC grossly normal differential reviewed PSA less than 0.01 urinalysis negative microalbumin creatinine ratio-NC      Most Recent Labs  CBC  Lab Results   Component Value Date/Time    WBC 6.8 2023 08:55 AM    WBC 6.7 10/11/2022 08:09 AM    WBC 7.2 2022 10:25 AM    RBC 4.96 2023 08:55 AM    RBC 4.87 10/11/2022 08:09 AM    RBC 4.97 2022 10:25 AM    HGB 14.9 2023 08:55 AM    HGB 14.9 10/11/2022 08:09 AM    HGB 14.8 2022 10:25 AM    HCT 46.0 2023 08:55 AM    HCT 45.3 10/11/2022 08:09 AM    HCT 44.7 2022 10:25 AM    MCV 92.7 2023 08:55 AM    MCV 93.0 10/11/2022 08:09 AM    MCV 89.9 2022 10:25 AM     2023 08:55 AM     10/11/2022 08:09 AM     2022 10:25 AM      CMP  Lab Results   Component Value Date/Time     2023 08:55 AM     10/11/2022 08:09 AM     2022 10:25 AM    K 4.5 2023 08:55 AM    K 4.8 10/11/2022 08:09 AM    K 5.1 2022 10:25 AM     2023 08:55 AM     10/11/2022 08:09 AM     2022 10:25 AM    CO2 27 2023 08:55 AM    CO2 24 10/11/2022 08:09 AM    CO2 26 2022 10:25 AM    ANIONGAP 11 2023 08:55 AM    ANIONGAP 13 10/11/2022 08:09 AM    ANIONGAP 12 2022 10:25 AM    GLUCOSE 119 2023 08:55 AM    GLUCOSE 160 10/11/2022 08:09 AM    GLUCOSE 148 05/24/2022 10:25 AM    BUN 18 02/06/2023 08:55 AM    BUN 19 10/11/2022 08:09 AM    BUN 19 05/24/2022 10:25 AM    CREATININE 0.9 02/06/2023 08:55 AM    CREATININE 0.8 10/11/2022 08:09 AM    CREATININE 0.9 05/24/2022 10:25 AM    LABGLOM >60 02/06/2023 08:55 AM    LABGLOM >60 10/11/2022 08:09 AM    LABGLOM >60 05/24/2022 10:25 AM    LABGLOM >60 09/10/2021 08:53 AM    GFRAA >60 10/11/2022 08:09 AM    GFRAA >60 05/24/2022 10:25 AM    GFRAA >60 09/10/2021 08:53 AM    CALCIUM 10.3 02/06/2023 08:55 AM    CALCIUM 10.5 10/11/2022 08:09 AM    CALCIUM 10.2 05/24/2022 10:25 AM    PROT 7.9 02/06/2023 08:55 AM    PROT 7.6 10/11/2022 08:09 AM    PROT 7.4 05/24/2022 10:25 AM    LABALBU 4.6 02/06/2023 08:55 AM    LABALBU 4.6 10/11/2022 08:09 AM    LABALBU 4.4 05/24/2022 10:25 AM    BILITOT 0.6 02/06/2023 08:55 AM    BILITOT 0.4 10/11/2022 08:09 AM    BILITOT 0.5 05/24/2022 10:25 AM    ALKPHOS 51 02/06/2023 08:55 AM    ALKPHOS 56 10/11/2022 08:09 AM    ALKPHOS 52 05/24/2022 10:25 AM    AST 32 02/06/2023 08:55 AM    AST 25 10/11/2022 08:09 AM    AST 26 05/24/2022 10:25 AM    ALT 45 02/06/2023 08:55 AM    ALT 47 10/11/2022 08:09 AM    ALT 39 05/24/2022 10:25 AM     A1C  Lab Results   Component Value Date/Time    LABA1C 7.1 02/06/2023 08:55 AM    LABA1C 7.6 10/11/2022 08:09 AM    LABA1C 7.5 05/24/2022 10:25 AM     TSH  Lab Results   Component Value Date/Time    TSH 2.170 02/06/2023 08:55 AM    TSH 1.680 10/11/2022 08:09 AM    TSH 1.510 05/24/2022 10:25 AM     FREET4  No results found for: [id number]  LIPID  Lab Results   Component Value Date/Time    CHOL 108 02/06/2023 08:55 AM    CHOL 180 10/11/2022 08:09 AM    CHOL 112 05/24/2022 10:25 AM    HDL 18 02/06/2023 08:55 AM    HDL 22 10/11/2022 08:09 AM    HDL 20 05/24/2022 10:25 AM    LDLCALC 53 02/06/2023 08:55 AM    LDLCALC 92 10/11/2022 08:09 AM    LDLCALC 56 05/24/2022 10:25 AM    TRIG 184 02/06/2023 08:55 AM    TRIG 331 10/11/2022 08:09 AM    TRIG 180 05/24/2022 10:25 AM    CHOLHDLRATIO 4.4 02/13/2021 12:00 AM    CHOLHDLRATIO 4.6 05/29/2020 12:00 AM    CHOLHDLRATIO 5.7 11/23/2019 12:00 AM     VITAMIN D  Lab Results   Component Value Date/Time    VITD25 54 02/06/2023 08:55 AM    VITD25 45 10/11/2022 08:09 AM    VITD25 45 05/24/2022 10:25 AM     MAGNESIUM  No results found for: MG   PHOS  No results found for: PHOS   MORALES   No results found for: MORALES  RHEUMATOID FACTOR  No results found for: RF  PSA  Lab Results   Component Value Date/Time    PSA <0.01 02/06/2023 08:55 AM    PSA <0.03 09/10/2021 08:53 AM    PSA  02/13/2021 12:00 AM      Comment:      <0.1      HEPATITIS C  Lab Results   Component Value Date/Time    HCVABI Non-Reactive 10/14/2020 08:56 AM     HIV  No results found for: XGF9XIC, HIV1QT  UA  Lab Results   Component Value Date/Time    COLORU Yellow 02/06/2023 08:54 AM    COLORU Yellow 10/11/2022 08:11 AM    COLORU Yellow 05/24/2022 10:24 AM    CLARITYU Clear 02/06/2023 08:54 AM    CLARITYU Clear 10/11/2022 08:11 AM    CLARITYU Clear 05/24/2022 10:24 AM    GLUCOSEU >=1000 02/06/2023 08:54 AM    GLUCOSEU >=1000 10/11/2022 08:11 AM    GLUCOSEU Negative 05/24/2022 10:24 AM    BILIRUBINUR Negative 02/06/2023 08:54 AM    BILIRUBINUR Negative 10/11/2022 08:11 AM    BILIRUBINUR Negative 05/24/2022 10:24 AM    BILIRUBINUR Negative 02/13/2021 12:00 AM    BILIRUBINUR Negative 05/29/2020 12:00 AM    BILIRUBINUR Negative 11/23/2019 12:00 AM    KETUA Negative 02/06/2023 08:54 AM    KETUA Negative 10/11/2022 08:11 AM    KETUA Negative 05/24/2022 10:24 AM    SPECGRAV 1.020 02/06/2023 08:54 AM    SPECGRAV >=1.030 10/11/2022 08:11 AM    SPECGRAV >=1.030 05/24/2022 10:24 AM    BLOODU Negative 02/06/2023 08:54 AM    BLOODU Negative 10/11/2022 08:11 AM    BLOODU Negative 05/24/2022 10:24 AM    PHUR 5.5 02/06/2023 08:54 AM    PHUR 5.5 10/11/2022 08:11 AM    PHUR 5.5 05/24/2022 10:24 AM    PROTEINU Negative 02/06/2023 08:54 AM    PROTEINU Negative 10/11/2022 08:11 AM    PROTEINU Negative 05/24/2022 10:24 AM    UROBILINOGEN 0.2 02/06/2023 08:54 AM    UROBILINOGEN 0.2 10/11/2022 08:11 AM    UROBILINOGEN 0.2 05/24/2022 10:24 AM    NITRU Negative 02/06/2023 08:54 AM    NITRU Negative 10/11/2022 08:11 AM    NITRU Negative 05/24/2022 10:24 AM    LEUKOCYTESUR Negative 02/06/2023 08:54 AM    LEUKOCYTESUR Negative 10/11/2022 08:11 AM    LEUKOCYTESUR Negative 05/24/2022 10:24 AM     Urine Micro/Albumin Ratio  Lab Results   Component Value Date/Time    MALBCR - 02/06/2023 08:54 AM    MALBCR - 10/11/2022 08:11 AM    MALBCR - 05/24/2022 10:24 AM             ROS:  Const: Denies chills, fever, malaise and sweats. Eyes: Chronic Eye sxs  ENMT: Denies earaches, other ear symptoms other than chronic hearing loss and right-sided tinnitus. . Denies nasal or sinus symptoms other than stated  above. Denies mouth and tongue lesions and sore throat. CV: Denies chest discomfort, pain; diaphoresis, dizziness, edema, lightheadedness, orthopnea,  palpitations, syncope and near syncopal episode or any exertional symptoms  Resp: Denies cough, hemoptysis, pleuritic pain, SOB, sputum production and wheezing. GI: Denies abdominal pain, change in bowel habits, hematochezia, melena, nausea and vomiting. : Denies urinary symptoms including dysuria , urgency, frequency or hematuria. Musculo: No acute symptoms  Skin: Denies bruising and rash.   Neuro: Denies headache, numbness, stiff neck, tingling and focal weakness slurred speech or facial  droop, chronic neuropathy  Hema/Lymph: Denies bleeding/bruising tendency and enlarged lymph nodes        Current Outpatient Medications:     FARXIGA 10 MG tablet, TAKE ONE TABLET BY MOUTH EVERY DAY, Disp: , Rfl:     Continuous Blood Gluc Sensor (FREESTYLE KETURAH 2 SENSOR) MISC, , Disp: , Rfl:     metFORMIN (GLUCOPHAGE) 1000 MG tablet, Take 1 tablet by mouth 2 times daily (with meals), Disp: 180 tablet, Rfl: 3    Insulin Pen Needle 32G X 4 MM MISC, 1 each by Does not apply route daily Injecting twice daily with Humalog Kwikpen- 5/32\", Disp: 200 each, Rfl: 3    atorvastatin (LIPITOR) 40 MG tablet, Take 1 tablet by mouth daily, Disp: 90 tablet, Rfl: 3    metoprolol succinate (TOPROL XL) 25 MG extended release tablet, Take 1 tablet by mouth daily, Disp: 90 tablet, Rfl: 3    quinapril (ACCUPRIL) 20 MG tablet, Take 1 tablet by mouth daily, Disp: 90 tablet, Rfl: 3    diclofenac sodium (VOLTAREN) 1 % GEL, 4g qid prn lower ext joints (knees,ankles,feet) max 16g/d. (spine,hip,shoulder use has not been evaluated), Disp: 1 Tube, Rfl: 3    HUMALOG MIX 75/25 KWIKPEN (75-25) 100 UNIT/ML SUPN injection pen, 106 Units 2 times daily (with meals) , Disp: , Rfl:     aspirin 81 MG tablet,  Take 81 mg by mouth daily Prescribed per Dr Yordy Powell. Contact Dr Luna Lomax preop instructions. , Disp: , Rfl:     vitamin B-12 (CYANOCOBALAMIN) 100 MCG tablet, Take 1,000 mcg by mouth daily, Disp: , Rfl:     vitamin D (CHOLECALCIFEROL) 1000 UNITS TABS tablet, Take 2,000 Units by mouth daily, Disp: , Rfl:   No Known Allergies    Past Medical History:   Diagnosis Date    Anemia     Bleeding of eye     behind eye going to have surgery 7/2015    Hyperlipidemia     Hypertension     Myalgia     Prostate cancer [facility])     Prostate enlargement     follows Dr Velasquez Current heart beat 03/2015    follows with Dr Elle Joshi    Type 2 diabetes mellitus without complication ([facility] [address]     Type II or unspecified type diabetes mellitus without mention of complication, not stated as uncontrolled     Vitamin D deficiency     Vitreous hemorrhage, right eye ([address]      Past Surgical History:   Procedure Laterality Date    CLEFT PALATE REPAIR      EYE SURGERY Bilateral     cataract surgery     KNEE SURGERY Right     OTHER SURGICAL HISTORY Right 7/08/2015    pars planta virectomy with par pan retinal photocoagulation    ROTATOR CUFF REPAIR Right      Family History   Problem Relation Age of Onset    Diabetes Mother     Heart Disease Mother Diabetes Father     Heart Disease Father      Social History     Tobacco Use    Smoking status: Former     Packs/day: 2.00     Years: 13.00     Pack years: 26.00     Types: Cigarettes     Quit date: 1984     Years since quittin.1    Smokeless tobacco: Never   Vaping Use    Vaping Use: Never used   Substance Use Topics    Alcohol use: No    Drug use: No      Social History     Social History Narrative    PMH:    Past Medical History: diabetes mellitus type II insulin requiring, hyperlipidemia, hypertension, Peyronie's    disease, prostate cancer, hypercalcemia, vitreous hemmorage B/L            Family Medical History: Dad  of diabetes complications at age 67. He had two myocardial infarction,    first at age 58 and 4-5 cerebrovascular accidents afterwards. Mom did not have coronary artery disease    that he is aware of but did have diabetes and  at the age of 80 of diabetic complications. Sister     at age 62 of diabetic complications, \"did not take care of herself\", had significant obesity, renal failure and    was on dialysis. He states mom had peripheral vascular disease and gangrene which lead to renal    failure and a cascade of events. Surgical Hx:    Rotator Cuff - RT Early     B/L Cataract    Vitreous Hemmorage B/L - SCAR TISSUE LEFT - BLIND    RIGHT EYE DOING OK. MULTIPLE SURGERIES ON BOTH    Left Shoulder - Rotator Cuff Dr Akira Decker     SH: Marital: , With 2 Children, Legal Status: . Personal Habits: Cigarette Use: Former Cigarette Smoker - Quit . Alcohol: Occasionally    consumes alcohol. Exercise Type: Walks 4 times a week, Employed Through [employer] -    Retired. .    Reviewed and updated.         Vitals:    23 1031   BP: 128/62   Pulse: 96   Temp: 97.6 °F (36.4 °C)   SpO2: 93%   Weight: 232 lb (105.2 kg)      Wt Readings from Last 3 Encounters:   23 232 lb (105.2 kg)   10/14/22 233 lb (105.7 kg)   22 226 lb (102.5 kg) Exam:  Const: Appears comfortable. No signs of acute distress present. Head/Face: Atraumatic on inspection. Eyes: EOMI in both eyes. PERRL. No injection  ENMT: Auditory canals normal. Tympanic membranes: intact and translucent. External nose WNL. Nasal mucosa is boggy oropharynx: No erythema or exudate. Posterior pharynx is watery postnasal drainage, chronic left  Neck: Supple. Palpation reveals no adenopathy. No masses appreciated. No JVD. Carotids: no  bruits. Resp: Respirations are unlabored. Clear to auscultation. No rales, rhonchi or wheezes appreciated  over the lungs bilaterally. CV: Rate is regular. Rhythm is regular. No gallop or rubs. No heart murmur appreciated. Extremities: No clubbing, cyanosis, or edema. No calf inflammation or tenderness. Abdomen: Bowel sounds are normoactive. Abdomen is soft, nontender, and nondistended. No  abdominal masses. No palpable hepatosplenomegaly. Lymph: No palpable or visible regional lymphadenopathy. Musculoskeletal: no acute joint inflammation. Skin: Dry and warm with no rash. Skin normal to inspection and palpation overall. Neuro: Alert and oriented. Affect: appropriate. Upper Extremities: 5/5 bilaterally. Lower Extremities:  5/5 bilaterally. Sensation grossly intact, dull to light touch reflexes: DTR's are symmetric and 2+ bilaterally. .  Cranial Nerves: Cranial nerves grossly intact. Assessment and Plan:   Diagnosis Orders   1. Type 2 diabetes mellitus with diabetic polyneuropathy, with long-term current use of insulin (HCC)  Urinalysis    Microalbumin / Creatinine Urine Ratio    TSH    Comprehensive Metabolic Panel    CBC with Auto Differential    Hemoglobin A1C      2. Mixed hyperlipidemia  Lipid Panel    TSH    CBC with Auto Differential    CK      3. Hypertension, essential        4. Carcinoma in situ of prostate        5. Vitamin D deficiency, unspecified  Vitamin D 25 Hydroxy      6. Vitamin B deficiency  Vitamin B12 & Folate      7. Health maintenance examination        8. Hypercalcemia  PTH, Intact    Calcium, Ionized      9. Elevated LFTs              No problem-specific Assessment & Plan notes found for this encounter. Type 2 diabetes mellitus with complication (HCC)  Improved control, following with Dr. Marley Clinton. Watch BS closely ambulatory. Parameters given. macro  and microvascular complications reviewed. Call if not in range. ER if dangerous  numbers. hyper and hypoglycemic precautions reviewed. con't per Dr Marley Clinton. Discussed regular eye exams, daily foot examinations. on high  dose insulin. risk of hypoglycemia and tx reviewed. On quinapril, risks benefits  reviewed   hemoglobin A1c trending down 7.1-7.3-7.5-7.6-7.1, continue lifestyle modification, defers to Dr. Marley Clinton     Mixed hyperlipidemia  Counseled, on atorvastatin 40 mg daily with standard precautions. HDL chronically low, LDL at goal triglycerides much improved. Working with dietitian. He would like to continue current therapy. lifestyle modification reviewed. risk of  hyperlipidemia reviewed tolerating therapy. Niaspan and fenofibrate was previously discontinued, Niaspan because of cost. Could consider  fenofibrate if triglycerides mandate , mildly elevated, now. Continue current therapy        Hypertension, essential  Stable. Watch ambulatory, parameters given. If out of range, notify. If dangerous numbers,  directly to ER. Risk of HTN reviewed. lifestyle modification emphasized. Risks of  hypertension and hypotension reviewed. Tolerating therapy. Tolerating Toprol. And quinapril. Carcinoma in situ of prostate  History of. Continue per specialists. Chasity . I recommended he  follow-up with urology still with history of prostate cancer, history of seeds. No longer  seeing Dr. Kimo Ford . Would like us to monitor PSA. PSA 2/23 less than 0. 01Anemia  chronic, stable, normal now. Counseled extensively. Differential reviewed, including  serious etiologies.  Declines further evaluation/treatment. Had colonoscopy 4/14 dr Litzy Healy; small patch of telengectasia. recommended repeat 5-7 yrs . He is in no  hurry . He has been stable. Defers FIT or cscope before physical 2021. Currently normal     Vitamin B deficiency  Stable/high. Appropriate supplementation reviewed, monitor ; cont otc, may reduce vitamin B12 to 3 times a week or every other day     Vitamin D deficiency, unspecified  stable, stable on D3 2000 IUs daily to 4000 IUs daily during winter months. Health maintenance examination  Health maintenance issues discussed at length   5/25/2022. Encouraged yearly, schedule next visit for this      OBesity  Counseled, risk reviewed, defers formal invention. Lifestyle education reviewed    Elevated ALT   Likely fatty liver. Precautions reviewed. Risks of even fatty liver l reviewed. Lifestyle modification and appropriate diet and weight loss reviewed. Other than basic monitoring defers in-depth blood work imaging or otherwise. ASX. Hypercalcemia   Flucates, stable,. PTH normal.  Signs and symptoms to watch for discussed. Serious signs and symptoms  reviewed. ER if any. Has been somewhat labile. He is asymptomatic. He will follow  with Dr. Sabina Almaraz. Potential seriousness reviewed. Defers additional screening  or repeat bone density       Neuropathy  Counseled extensively. Differential reviewed, including serious etiologies. Consider gabapentin but declines now. He will think about it. Defers EMG nerve conduction study. Plan as above. Counseled extensively and differential diagnoses relevant to above were reviewed, including serious etiologies. If relevant, instructions and  alternatives to meds/treatment reviewed, as well as interactions, and  SE's/ADRs reviewed, notify immediately if any, discontinuing new meds if any. Plan made after discussion and shared decision making. Continue per specialists. Continue current therapy.   Blood work and follow-up annual wellness visit 4 months sooner as needed. As long as symptoms steadily improve/resolve, and medical conditions follow the expected course, FU as below, sooner PRN. Return in about 4 months (around 6/9/2023), or if symptoms worsen or fail to improve, for FU and AWV. Educational materials and/or home exercises printed for patient's review and were included in patient instructions on his/her After Visit Summary and given to patient at the end of visit. After discussion, patient and/or guardian verbalizes understanding, agrees, feels comfortable with and wishes to proceed with above treatment plan. Call for any pending results, FU sooner if abnormal, as needed or if any current symptoms persist/worsen. Advised patient to call with any new medication issues, and read all Rx info from pharmacy to assure aware of all possible risks and side effects of medication before taking. Reviewed age and gender appropriate health screening exams and vaccinations. Advised patient regarding importance of keeping up with recommended health maintenance and to schedule as soon as possible if overdue, as this is important in assessing for undiagnosed pathology, especially cancer, as well as protecting against potentially harmful/life threatening disease. Patient and/or guardian verbalizes understanding and agrees with above counseling, assessment and plan. All questions answered. Signs and symptoms to watch for discussed, serious signs and symptoms reviewed. ER if any. Cristineben Jackson MD    Patients are advised to check with insurance company to ensure coverage and to fully understand benefits and cost prior to any testing. This note was created with the assistance of voice recognition software. Document was reviewed however may contain grammatical errors.

## 2023-02-09 NOTE — TELEPHONE ENCOUNTER
Unable to reach patient. Please try for me. He is on quinapril 20 mg daily. Unfortunately there is no direct substitution. If no history of intolerance etc. we can switch to lisinopril 20 mg daily. Monitor blood pressure closely at home notifying us if out of range. I would recommend a BMP in 5 to 7 days and a follow-up with me in 3 weeks because of medication change.   If agreeable let me know so I can order, find out pharmacy etc.

## 2023-02-10 RX ORDER — LISINOPRIL 20 MG/1
20 TABLET ORAL DAILY
Qty: 30 TABLET | Refills: 1 | Status: SHIPPED | OUTPATIENT
Start: 2023-02-10

## 2023-02-10 NOTE — TELEPHONE ENCOUNTER
Patient notified  Agreeable to start lisinopril   Voiced understanding for repeat labs and follow up appointment  Will notify if blood pressures are out of range     [facility] to [facility]

## 2023-02-21 DIAGNOSIS — I10 HYPERTENSION, ESSENTIAL: ICD-10-CM

## 2023-02-21 LAB
ANION GAP SERPL CALCULATED.3IONS-SCNC: 8 MMOL/L (ref 7–16)
BUN BLDV-MCNC: 18 MG/DL (ref 6–23)
CALCIUM SERPL-MCNC: 11 MG/DL (ref 8.6–10.2)
CHLORIDE BLD-SCNC: 106 MMOL/L (ref 98–107)
CO2: 29 MMOL/L (ref 22–29)
CREAT SERPL-MCNC: 0.9 MG/DL (ref 0.7–1.2)
GFR SERPL CREATININE-BSD FRML MDRD: >60 ML/MIN/1.73
GLUCOSE BLD-MCNC: 66 MG/DL (ref 74–99)
POTASSIUM SERPL-SCNC: 5 MMOL/L (ref 3.5–5)
SODIUM BLD-SCNC: 143 MMOL/L (ref 132–146)

## 2023-03-02 DIAGNOSIS — I10 HYPERTENSION, ESSENTIAL: ICD-10-CM

## 2023-03-02 DIAGNOSIS — E83.52 HYPERCALCEMIA: ICD-10-CM

## 2023-03-02 LAB
ANION GAP SERPL CALCULATED.3IONS-SCNC: 17 MMOL/L (ref 7–16)
BUN BLDV-MCNC: 21 MG/DL (ref 6–23)
CALCIUM SERPL-MCNC: 10.9 MG/DL (ref 8.6–10.2)
CHLORIDE BLD-SCNC: 106 MMOL/L (ref 98–107)
CO2: 22 MMOL/L (ref 22–29)
CREAT SERPL-MCNC: 0.9 MG/DL (ref 0.7–1.2)
GFR SERPL CREATININE-BSD FRML MDRD: >60 ML/MIN/1.73
GLUCOSE BLD-MCNC: 219 MG/DL (ref 74–99)
POTASSIUM SERPL-SCNC: 4.9 MMOL/L (ref 3.5–5)
SODIUM BLD-SCNC: 145 MMOL/L (ref 132–146)

## 2023-03-10 DIAGNOSIS — I10 HYPERTENSION, ESSENTIAL: ICD-10-CM

## 2023-03-10 RX ORDER — LISINOPRIL 20 MG/1
20 TABLET ORAL DAILY
Qty: 90 TABLET | Refills: 1 | Status: SHIPPED | OUTPATIENT
Start: 2023-03-10

## 2023-05-22 ENCOUNTER — OFFICE VISIT (OUTPATIENT)
Dept: FAMILY MEDICINE CLINIC | Age: 73
End: 2023-05-22
Payer: MEDICARE

## 2023-05-22 VITALS
RESPIRATION RATE: 20 BRPM | HEIGHT: 70 IN | DIASTOLIC BLOOD PRESSURE: 66 MMHG | WEIGHT: 227 LBS | OXYGEN SATURATION: 96 % | TEMPERATURE: 97.8 F | HEART RATE: 72 BPM | SYSTOLIC BLOOD PRESSURE: 120 MMHG | BODY MASS INDEX: 32.5 KG/M2

## 2023-05-22 DIAGNOSIS — M54.50 ACUTE RIGHT-SIDED LOW BACK PAIN WITHOUT SCIATICA: Primary | ICD-10-CM

## 2023-05-22 PROCEDURE — 3074F SYST BP LT 130 MM HG: CPT

## 2023-05-22 PROCEDURE — 99213 OFFICE O/P EST LOW 20 MIN: CPT

## 2023-05-22 PROCEDURE — 3078F DIAST BP <80 MM HG: CPT

## 2023-05-22 PROCEDURE — 1123F ACP DISCUSS/DSCN MKR DOCD: CPT

## 2023-05-22 RX ORDER — TIZANIDINE 2 MG/1
2 TABLET ORAL 3 TIMES DAILY PRN
Qty: 30 TABLET | Refills: 0 | Status: SHIPPED | OUTPATIENT
Start: 2023-05-22
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
## 2023-06-14 DIAGNOSIS — E11.42 TYPE 2 DIABETES MELLITUS WITH DIABETIC POLYNEUROPATHY, WITH LONG-TERM CURRENT USE OF INSULIN (HCC): ICD-10-CM

## 2023-06-14 DIAGNOSIS — E83.52 HYPERCALCEMIA: ICD-10-CM

## 2023-06-14 DIAGNOSIS — Z79.4 TYPE 2 DIABETES MELLITUS WITH DIABETIC POLYNEUROPATHY, WITH LONG-TERM CURRENT USE OF INSULIN (HCC): ICD-10-CM

## 2023-06-14 DIAGNOSIS — E53.9 VITAMIN B DEFICIENCY: ICD-10-CM

## 2023-06-14 DIAGNOSIS — E78.2 MIXED HYPERLIPIDEMIA: ICD-10-CM

## 2023-06-14 DIAGNOSIS — E55.9 VITAMIN D DEFICIENCY, UNSPECIFIED: ICD-10-CM

## 2023-06-14 LAB
ALBUMIN SERPL-MCNC: 4.4 G/DL (ref 3.5–5.2)
ALP SERPL-CCNC: 49 U/L (ref 40–129)
ALT SERPL-CCNC: 33 U/L (ref 0–40)
ANION GAP SERPL CALCULATED.3IONS-SCNC: 18 MMOL/L (ref 7–16)
AST SERPL-CCNC: 25 U/L (ref 0–39)
BASOPHILS # BLD: 0.05 E9/L (ref 0–0.2)
BASOPHILS NFR BLD: 0.8 % (ref 0–2)
BILIRUB SERPL-MCNC: 0.5 MG/DL (ref 0–1.2)
BUN SERPL-MCNC: 19 MG/DL (ref 6–23)
CA-I BLD-SCNC: 1.37 MMOL/L (ref 1.15–1.33)
CALCIUM SERPL-MCNC: 10.6 MG/DL (ref 8.6–10.2)
CHLORIDE SERPL-SCNC: 105 MMOL/L (ref 98–107)
CHOLESTEROL, TOTAL: 122 MG/DL (ref 0–199)
CK SERPL-CCNC: 209 U/L (ref 20–200)
CO2 SERPL-SCNC: 22 MMOL/L (ref 22–29)
CREAT SERPL-MCNC: 0.9 MG/DL (ref 0.7–1.2)
EOSINOPHIL # BLD: 0.12 E9/L (ref 0.05–0.5)
EOSINOPHIL NFR BLD: 1.9 % (ref 0–6)
ERYTHROCYTE [DISTWIDTH] IN BLOOD BY AUTOMATED COUNT: 14.5 FL (ref 11.5–15)
FOLATE SERPL-MCNC: 16.5 NG/ML (ref 4.8–24.2)
GLUCOSE SERPL-MCNC: 148 MG/DL (ref 74–99)
HBA1C MFR BLD: 6.8 % (ref 4–5.6)
HCT VFR BLD AUTO: 48.1 % (ref 37–54)
HDLC SERPL-MCNC: 19 MG/DL
HGB BLD-MCNC: 15.2 G/DL (ref 12.5–16.5)
IMM GRANULOCYTES # BLD: 0.03 E9/L
IMM GRANULOCYTES NFR BLD: 0.5 % (ref 0–5)
LDLC SERPL CALC-MCNC: 55 MG/DL (ref 0–99)
LYMPHOCYTES # BLD: 1.71 E9/L (ref 1.5–4)
LYMPHOCYTES NFR BLD: 27.2 % (ref 20–42)
MCH RBC QN AUTO: 29.7 PG (ref 26–35)
MCHC RBC AUTO-ENTMCNC: 31.6 % (ref 32–34.5)
MCV RBC AUTO: 94.1 FL (ref 80–99.9)
MONOCYTES # BLD: 0.4 E9/L (ref 0.1–0.95)
MONOCYTES NFR BLD: 6.4 % (ref 2–12)
NEUTROPHILS # BLD: 3.97 E9/L (ref 1.8–7.3)
NEUTS SEG NFR BLD: 63.2 % (ref 43–80)
PLATELET # BLD AUTO: 228 E9/L (ref 130–450)
PMV BLD AUTO: 12.6 FL (ref 7–12)
POTASSIUM SERPL-SCNC: 5 MMOL/L (ref 3.5–5)
PROT SERPL-MCNC: 7.5 G/DL (ref 6.4–8.3)
PTH-INTACT SERPL-MCNC: 27 PG/ML (ref 15–65)
RBC # BLD AUTO: 5.11 E12/L (ref 3.8–5.8)
SODIUM SERPL-SCNC: 145 MMOL/L (ref 132–146)
TRIGL SERPL-MCNC: 241 MG/DL (ref 0–149)
TSH SERPL-MCNC: 2.44 UIU/ML (ref 0.27–4.2)
VIT B12 SERPL-MCNC: 678 PG/ML (ref 211–946)
VITAMIN D 25-HYDROXY: 38 NG/ML (ref 30–100)
VLDLC SERPL CALC-MCNC: 48 MG/DL
WBC # BLD: 6.3 E9/L (ref 4.5–11.5)

## 2023-06-22 DIAGNOSIS — Z12.11 COLON CANCER SCREENING: ICD-10-CM

## 2023-06-22 LAB
CONTROL: NORMAL
HEMOCCULT STL QL: NEGATIVE

## 2023-06-22 PROCEDURE — 82274 ASSAY TEST FOR BLOOD FECAL: CPT | Performed by: FAMILY MEDICINE

## 2023-09-08 DIAGNOSIS — I10 HYPERTENSION, ESSENTIAL: ICD-10-CM

## 2023-09-08 RX ORDER — LISINOPRIL 20 MG/1
20 TABLET ORAL DAILY
Qty: 90 TABLET | Refills: 1 | Status: SHIPPED | OUTPATIENT
Start: 2023-09-08

## 2023-09-08 NOTE — TELEPHONE ENCOUNTER
Refill request      Last Appointment:  6/15/2023    Future appts:  Future Appointments   Date Time Provider [address]   10/16/2023  8:00 AM [facility]

## 2023-10-30 DIAGNOSIS — E78.2 MIXED HYPERLIPIDEMIA: ICD-10-CM

## 2023-10-30 RX ORDER — ATORVASTATIN CALCIUM 40 MG/1
40 TABLET, FILM COATED ORAL DAILY
Qty: 90 TABLET | Refills: 3 | Status: SHIPPED | OUTPATIENT
Start: 2023-10-30

## 2023-11-09 ENCOUNTER — OFFICE VISIT (OUTPATIENT)
Dept: PRIMARY CARE CLINIC | Age: 73
End: 2023-11-09
Payer: MEDICARE

## 2023-11-09 VITALS
SYSTOLIC BLOOD PRESSURE: 128 MMHG | OXYGEN SATURATION: 97 % | TEMPERATURE: 97.8 F | HEART RATE: 68 BPM | WEIGHT: 227 LBS | BODY MASS INDEX: 32.81 KG/M2 | DIASTOLIC BLOOD PRESSURE: 62 MMHG

## 2023-11-09 DIAGNOSIS — Z79.4 LONG TERM CURRENT USE OF INSULIN (HCC): ICD-10-CM

## 2023-11-09 DIAGNOSIS — C61 PRIMARY MALIGNANT NEOPLASM OF PROSTATE (HCC): ICD-10-CM

## 2023-11-09 DIAGNOSIS — E78.2 MIXED HYPERLIPIDEMIA: ICD-10-CM

## 2023-11-09 DIAGNOSIS — E83.52 HYPERCALCEMIA: ICD-10-CM

## 2023-11-09 DIAGNOSIS — E11.42 TYPE 2 DIABETES MELLITUS WITH DIABETIC POLYNEUROPATHY, WITH LONG-TERM CURRENT USE OF INSULIN (HCC): Primary | ICD-10-CM

## 2023-11-09 DIAGNOSIS — Z79.4 TYPE 2 DIABETES MELLITUS WITH DIABETIC POLYNEUROPATHY, WITH LONG-TERM CURRENT USE OF INSULIN (HCC): Primary | ICD-10-CM

## 2023-11-09 DIAGNOSIS — Z00.00 HEALTH MAINTENANCE EXAMINATION: ICD-10-CM

## 2023-11-09 DIAGNOSIS — I10 HYPERTENSION, ESSENTIAL: ICD-10-CM

## 2023-11-09 DIAGNOSIS — E55.9 VITAMIN D DEFICIENCY, UNSPECIFIED: ICD-10-CM

## 2023-11-09 DIAGNOSIS — E53.9 VITAMIN B DEFICIENCY: ICD-10-CM

## 2023-11-09 PROBLEM — E11.49 DIABETIC NEUROPATHY WITH NEUROLOGIC COMPLICATION (HCC): Status: RESOLVED | Noted: 2019-06-11 | Resolved: 2023-11-09

## 2023-11-09 PROBLEM — E11.40 DIABETIC NEUROPATHY WITH NEUROLOGIC COMPLICATION (HCC): Status: RESOLVED | Noted: 2019-06-11 | Resolved: 2023-11-09

## 2023-11-09 PROCEDURE — 99214 OFFICE O/P EST MOD 30 MIN: CPT | Performed by: FAMILY MEDICINE

## 2023-11-09 PROCEDURE — 3074F SYST BP LT 130 MM HG: CPT | Performed by: FAMILY MEDICINE

## 2023-11-09 PROCEDURE — 1123F ACP DISCUSS/DSCN MKR DOCD: CPT | Performed by: FAMILY MEDICINE

## 2023-11-09 PROCEDURE — 3044F HG A1C LEVEL LT 7.0%: CPT | Performed by: FAMILY MEDICINE

## 2023-11-09 PROCEDURE — 3078F DIAST BP <80 MM HG: CPT | Performed by: FAMILY MEDICINE

## 2023-11-09 RX ORDER — LATANOPROST 50 UG/ML
SOLUTION/ DROPS OPHTHALMIC
COMMUNITY
Start: 2023-10-19

## 2023-11-09 RX ORDER — METOPROLOL SUCCINATE 25 MG/1
25 TABLET, EXTENDED RELEASE ORAL DAILY
Qty: 90 TABLET | Refills: 3 | Status: SHIPPED | OUTPATIENT
Start: 2023-11-09

## 2023-11-09 NOTE — PROGRESS NOTES
high  dose insulin. risk of hypoglycemia and tx reviewed. On quinapril, risks benefits  reviewed   hemoglobin A1c trending down last time, 7.1-7.3-7.5-7.6-7.1-6.8, continue lifestyle modification. Refill metformin     Mixed hyperlipidemia  Counseled, on atorvastatin 40 mg daily with standard precautions. HDL chronically low, LDL at goal triglycerides much improved. Working with dietitian. He would like to continue current therapy. lifestyle modification reviewed. risk of  hyperlipidemia reviewed tolerating therapy. Niaspan and fenofibrate was previously discontinued, Niaspan because of cost. Could consider  fenofibrate if triglycerides dictate, defers change. Continue current therapy        Hypertension, essential  Stable. Watch ambulatory, parameters given. If out of range, notify. If dangerous numbers,  directly to ER. Risk of HTN reviewed. lifestyle modification emphasized. Risks of  hypertension and hypotension reviewed. Tolerating therapy. Tolerating metoprolol. And quinapril. Refill metoprolol     Carcinoma in situ of prostate  History of. Continue per specialists. , Chasity . I recommended he  follow-up with urology still with history of prostate cancer, history of seeds. No longer  seeing Dr. Cali Samano . Would like us to monitor PSA. PSA 2/23 less than 0.01      Anemia  chronic, stable, normal now. Counseled extensively. Differential reviewed, including  serious etiologies. Declines further evaluation/treatment. Had colonoscopy 4/14 dr Geovani Pritchett; small patch of telengectasia. recommended repeat 5-7 yrs . He is in no  hurry . He has been stable. Defers FIT or cscope before physical 2021. Currently normal     Vitamin B deficiency  Stable/high. Appropriate supplementation reviewed, monitor ; cont otc, may reduce vitamin B12 to 3 times a week or every other day     Vitamin D deficiency, unspecified  stable, stable on D3 2000 IUs daily to 4000 IUs daily during winter months.     Health maintenance

## 2023-11-14 DIAGNOSIS — E83.52 HYPERCALCEMIA: ICD-10-CM

## 2023-11-14 DIAGNOSIS — E11.42 TYPE 2 DIABETES MELLITUS WITH DIABETIC POLYNEUROPATHY, WITH LONG-TERM CURRENT USE OF INSULIN (HCC): ICD-10-CM

## 2023-11-14 DIAGNOSIS — E55.9 VITAMIN D DEFICIENCY, UNSPECIFIED: ICD-10-CM

## 2023-11-14 DIAGNOSIS — Z79.4 TYPE 2 DIABETES MELLITUS WITH DIABETIC POLYNEUROPATHY, WITH LONG-TERM CURRENT USE OF INSULIN (HCC): ICD-10-CM

## 2023-11-14 DIAGNOSIS — D07.5 CARCINOMA IN SITU OF PROSTATE: ICD-10-CM

## 2023-11-14 DIAGNOSIS — E78.2 MIXED HYPERLIPIDEMIA: ICD-10-CM

## 2023-11-14 LAB
ABSOLUTE IMMATURE GRANULOCYTE: <0.03 K/UL (ref 0–0.58)
ALBUMIN SERPL-MCNC: 4.4 G/DL (ref 3.5–5.2)
ALP BLD-CCNC: 53 U/L (ref 40–129)
ALT SERPL-CCNC: 25 U/L (ref 0–40)
ANION GAP SERPL CALCULATED.3IONS-SCNC: 11 MMOL/L (ref 7–16)
AST SERPL-CCNC: 17 U/L (ref 0–39)
BASOPHILS ABSOLUTE: 0.06 K/UL (ref 0–0.2)
BASOPHILS RELATIVE PERCENT: 1 % (ref 0–2)
BILIRUB SERPL-MCNC: 0.5 MG/DL (ref 0–1.2)
BILIRUBIN URINE: NEGATIVE
BUN BLDV-MCNC: 24 MG/DL (ref 6–23)
CALCIUM IONIZED: 1.32 MMOL/L (ref 1.15–1.33)
CALCIUM SERPL-MCNC: 10.3 MG/DL (ref 8.6–10.2)
CHLORIDE BLD-SCNC: 103 MMOL/L (ref 98–107)
CHOLESTEROL: 106 MG/DL
CO2: 26 MMOL/L (ref 22–29)
COLOR: YELLOW
COMMENT: ABNORMAL
CREAT SERPL-MCNC: 0.9 MG/DL (ref 0.7–1.2)
CREATININE URINE: 95.7 MG/DL (ref 40–278)
EOSINOPHILS ABSOLUTE: 0.13 K/UL (ref 0.05–0.5)
EOSINOPHILS RELATIVE PERCENT: 2 % (ref 0–6)
GFR SERPL CREATININE-BSD FRML MDRD: >60 ML/MIN/1.73M2
GLUCOSE BLD-MCNC: 142 MG/DL (ref 74–99)
GLUCOSE URINE: >=1000 MG/DL
HBA1C MFR BLD: 6.7 % (ref 4–5.6)
HCT VFR BLD CALC: 46.6 % (ref 37–54)
HDLC SERPL-MCNC: 20 MG/DL
HEMOGLOBIN: 14.7 G/DL (ref 12.5–16.5)
IMMATURE GRANULOCYTES: 0 % (ref 0–5)
KETONES, URINE: NEGATIVE MG/DL
LDL CHOLESTEROL: 52 MG/DL
LEUKOCYTE ESTERASE, URINE: NEGATIVE
LYMPHOCYTES ABSOLUTE: 2.03 K/UL (ref 1.5–4)
LYMPHOCYTES RELATIVE PERCENT: 28 % (ref 20–42)
MCH RBC QN AUTO: 29.9 PG (ref 26–35)
MCHC RBC AUTO-ENTMCNC: 31.5 G/DL (ref 32–34.5)
MCV RBC AUTO: 94.9 FL (ref 80–99.9)
MICROALBUMIN/CREAT 24H UR: <12 MG/L (ref 0–19)
MICROALBUMIN/CREAT UR-RTO: NORMAL MCG/MG CREAT (ref 0–30)
MONOCYTES ABSOLUTE: 0.47 K/UL (ref 0.1–0.95)
MONOCYTES RELATIVE PERCENT: 6 % (ref 2–12)
NEUTROPHILS ABSOLUTE: 4.66 K/UL (ref 1.8–7.3)
NEUTROPHILS RELATIVE PERCENT: 63 % (ref 43–80)
NITRITE, URINE: NEGATIVE
PDW BLD-RTO: 13.6 % (ref 11.5–15)
PH UA: 5 (ref 5–9)
PLATELET # BLD: 210 K/UL (ref 130–450)
PMV BLD AUTO: 13 FL (ref 7–12)
POTASSIUM SERPL-SCNC: 4.1 MMOL/L (ref 3.5–5)
PROSTATE SPECIFIC ANTIGEN: <0.01 NG/ML (ref 0–4)
PROTEIN UA: NEGATIVE MG/DL
PTH INTACT: 24.8 PG/ML (ref 15–65)
RBC # BLD: 4.91 M/UL (ref 3.8–5.8)
SODIUM BLD-SCNC: 140 MMOL/L (ref 132–146)
SPECIFIC GRAVITY UA: >1.03 (ref 1–1.03)
TOTAL CK: 140 U/L (ref 20–200)
TOTAL PROTEIN: 7.9 G/DL (ref 6.4–8.3)
TRIGL SERPL-MCNC: 169 MG/DL
TSH SERPL DL<=0.05 MIU/L-ACNC: 2.46 UIU/ML (ref 0.27–4.2)
TURBIDITY: CLEAR
URINE HGB: NEGATIVE
UROBILINOGEN, URINE: 0.2 EU/DL (ref 0–1)
VITAMIN D 25-HYDROXY: 38.5 NG/ML (ref 30–100)
VLDLC SERPL CALC-MCNC: 34 MG/DL
WBC # BLD: 7.4 K/UL (ref 4.5–11.5)

## 2023-11-27 ENCOUNTER — OFFICE VISIT (OUTPATIENT)
Dept: PRIMARY CARE CLINIC | Age: 73
End: 2023-11-27
Payer: MEDICARE

## 2023-11-27 VITALS
TEMPERATURE: 97.1 F | BODY MASS INDEX: 32.66 KG/M2 | WEIGHT: 226 LBS | DIASTOLIC BLOOD PRESSURE: 58 MMHG | OXYGEN SATURATION: 93 % | SYSTOLIC BLOOD PRESSURE: 122 MMHG | HEART RATE: 74 BPM

## 2023-11-27 DIAGNOSIS — C61 PRIMARY MALIGNANT NEOPLASM OF PROSTATE (HCC): ICD-10-CM

## 2023-11-27 DIAGNOSIS — E53.9 VITAMIN B DEFICIENCY: ICD-10-CM

## 2023-11-27 DIAGNOSIS — E55.9 VITAMIN D DEFICIENCY, UNSPECIFIED: ICD-10-CM

## 2023-11-27 DIAGNOSIS — E83.52 HYPERCALCEMIA: ICD-10-CM

## 2023-11-27 DIAGNOSIS — Z79.4 TYPE 2 DIABETES MELLITUS WITH DIABETIC POLYNEUROPATHY, WITH LONG-TERM CURRENT USE OF INSULIN (HCC): Primary | ICD-10-CM

## 2023-11-27 DIAGNOSIS — I10 HYPERTENSION, ESSENTIAL: ICD-10-CM

## 2023-11-27 DIAGNOSIS — Z79.4 LONG TERM CURRENT USE OF INSULIN (HCC): ICD-10-CM

## 2023-11-27 DIAGNOSIS — D07.5 CARCINOMA IN SITU OF PROSTATE: ICD-10-CM

## 2023-11-27 DIAGNOSIS — E11.42 TYPE 2 DIABETES MELLITUS WITH DIABETIC POLYNEUROPATHY, WITH LONG-TERM CURRENT USE OF INSULIN (HCC): Primary | ICD-10-CM

## 2023-11-27 DIAGNOSIS — E78.2 MIXED HYPERLIPIDEMIA: ICD-10-CM

## 2023-11-27 PROCEDURE — 3044F HG A1C LEVEL LT 7.0%: CPT | Performed by: FAMILY MEDICINE

## 2023-11-27 PROCEDURE — 3078F DIAST BP <80 MM HG: CPT | Performed by: FAMILY MEDICINE

## 2023-11-27 PROCEDURE — 99214 OFFICE O/P EST MOD 30 MIN: CPT | Performed by: FAMILY MEDICINE

## 2023-11-27 PROCEDURE — 1123F ACP DISCUSS/DSCN MKR DOCD: CPT | Performed by: FAMILY MEDICINE

## 2023-11-27 PROCEDURE — 3074F SYST BP LT 130 MM HG: CPT | Performed by: FAMILY MEDICINE

## 2023-11-27 NOTE — PROGRESS NOTES
Demetrius Loya : 1950 Sex: male  Age: 68 y.o.     Chief Complaint   Patient presents with    Discuss Labs       HPI:    Very pleasant patient presents today for follow-up labs    Labs are overall stable, BUN 24 glucose 142 calcium down to 10.3 HDL 20 LDL 52 triglyceride 169 PTH 24.8 TSH 2.46 vitamin D 38.5 CBC grossly normal differential reviewed urinalysis reviewed      Most Recent Labs  CBC  Lab Results   Component Value Date/Time    WBC 7.4 2023 08:49 AM    WBC 6.3 2023 08:46 AM    WBC 6.8 2023 08:55 AM    RBC 4.91 2023 08:49 AM    RBC 5.11 2023 08:46 AM    RBC 4.96 2023 08:55 AM    HGB 14.7 2023 08:49 AM    HGB 15.2 2023 08:46 AM    HGB 14.9 2023 08:55 AM    HCT 46.6 2023 08:49 AM    HCT 48.1 2023 08:46 AM    HCT 46.0 2023 08:55 AM    MCV 94.9 2023 08:49 AM    MCV 94.1 2023 08:46 AM    MCV 92.7 2023 08:55 AM     2023 08:49 AM     2023 08:46 AM     2023 08:55 AM      CMP  Lab Results   Component Value Date/Time     2023 08:49 AM     2023 08:46 AM     2023 08:26 AM    K 4.1 2023 08:49 AM    K 5.0 2023 08:46 AM    K 4.9 2023 08:26 AM     2023 08:49 AM     2023 08:46 AM     2023 08:26 AM    CO2 26 2023 08:49 AM    CO2 22 2023 08:46 AM    CO2 22 2023 08:26 AM    ANIONGAP 11 2023 08:49 AM    ANIONGAP 18 2023 08:46 AM    ANIONGAP 17 2023 08:26 AM    GLUCOSE 142 2023 08:49 AM    GLUCOSE 148 2023 08:46 AM    GLUCOSE 219 2023 08:26 AM    BUN 24 2023 08:49 AM    BUN 19 2023 08:46 AM    BUN 21 2023 08:26 AM    CREATININE 0.9 2023 08:49 AM    CREATININE 0.9 2023 08:46 AM    CREATININE 0.9 2023 08:26 AM    LABGLOM >60 2023 08:49 AM    LABGLOM >60 2023 08:46 AM    LABGLOM >60 2023 08:26 AM

## 2024-01-17 ENCOUNTER — OFFICE VISIT (OUTPATIENT)
Dept: FAMILY MEDICINE CLINIC | Age: 74
End: 2024-01-17
Payer: MEDICARE

## 2024-01-17 VITALS
OXYGEN SATURATION: 98 % | HEART RATE: 97 BPM | TEMPERATURE: 98.1 F | HEIGHT: 70 IN | WEIGHT: 228 LBS | BODY MASS INDEX: 32.64 KG/M2 | SYSTOLIC BLOOD PRESSURE: 122 MMHG | DIASTOLIC BLOOD PRESSURE: 68 MMHG

## 2024-01-17 DIAGNOSIS — R09.82 POSTNASAL DRIP: ICD-10-CM

## 2024-01-17 DIAGNOSIS — J01.90 ACUTE NON-RECURRENT SINUSITIS, UNSPECIFIED LOCATION: Primary | ICD-10-CM

## 2024-01-17 PROCEDURE — 99203 OFFICE O/P NEW LOW 30 MIN: CPT | Performed by: PHYSICIAN ASSISTANT

## 2024-01-17 PROCEDURE — 1123F ACP DISCUSS/DSCN MKR DOCD: CPT | Performed by: PHYSICIAN ASSISTANT

## 2024-01-17 PROCEDURE — 3078F DIAST BP <80 MM HG: CPT | Performed by: PHYSICIAN ASSISTANT

## 2024-01-17 PROCEDURE — 3074F SYST BP LT 130 MM HG: CPT | Performed by: PHYSICIAN ASSISTANT

## 2024-01-17 RX ORDER — CEFDINIR 300 MG/1
300 CAPSULE ORAL 2 TIMES DAILY
Qty: 20 CAPSULE | Refills: 0 | Status: SHIPPED | OUTPATIENT
Start: 2024-01-17 | End: 2024-01-27

## 2024-01-17 NOTE — PROGRESS NOTES
24  Ulises Chu : 1950 Sex: male  Age 73 y.o.      Subjective:  Chief Complaint   Patient presents with    Nasal Congestion     Declines testing         HPI:   HPI  Ulises Chu , 73 y.o. male presents to [facility] care for evaluation of sinus congestion, drainage.  The patient is had the symptoms ongoing for 4 weeks.  The patient states typically without any medications and will clear up in 3 weeks.  The patient has not really been taking anything over-the-counter.  No fever, chills.  No chest pain, shortness of breath.  Just nasal congestion and postnasal drip.  The patient is not having any fevers, chills, myalgias.  No loss of smell, taste.        ROS:   Unless otherwise stated in this report the patient's positive and negative responses for review of systems for constitutional, eyes, ENT, cardiovascular, respiratory, gastrointestinal, neurological, , musculoskeletal, and integument systems and related systems to the presenting problem are either stated in the history of present illness or were not pertinent or were negative for the symptoms and/or complaints related to the presenting medical problem.  Positives and pertinent negatives as per HPI.  All others reviewed and are negative.      PMH:     Past Medical History:   Diagnosis Date    Anemia     Bleeding of eye     behind eye going to have surgery 2015    Hyperlipidemia     Hypertension     Myalgia     Prostate cancer (HCC)     Prostate enlargement     follows Dr Marie Lynn heart beat 2015    follows with Dr Can    Type 2 diabetes mellitus without complication (HCC)     Type II or unspecified type diabetes mellitus without mention of complication, not stated as uncontrolled     Vitamin D deficiency     Vitreous hemorrhage, right eye (HCC)        Past Surgical History:   Procedure Laterality Date    CLEFT PALATE REPAIR      EYE SURGERY Bilateral     cataract surgery     KNEE SURGERY Right     OTHER SURGICAL HISTORY

## 2024-03-04 DIAGNOSIS — I10 HYPERTENSION, ESSENTIAL: ICD-10-CM

## 2024-03-04 RX ORDER — LISINOPRIL 20 MG/1
20 TABLET ORAL DAILY
Qty: 90 TABLET | Refills: 1 | Status: SHIPPED | OUTPATIENT
Start: 2024-03-04

## 2024-03-04 NOTE — TELEPHONE ENCOUNTER
Medication and pharmacy info verified with the pt       Last Appointment:  11/27/2023    Future appts:  Future Appointments   Date Time Provider Department Center   5/28/2024 11:00 AM Tomas Beltran MD [facility]

## 2024-03-05 LAB
ESTIMATED AVERAGE GLUCOSE: ABNORMAL
HBA1C MFR BLD: 6.4 %

## 2024-03-06 ENCOUNTER — OFFICE VISIT (OUTPATIENT)
Dept: FAMILY MEDICINE CLINIC | Age: 74
End: 2024-03-06
Payer: MEDICARE

## 2024-03-06 VITALS
HEART RATE: 96 BPM | BODY MASS INDEX: 32.35 KG/M2 | OXYGEN SATURATION: 96 % | HEIGHT: 70 IN | TEMPERATURE: 97.9 F | DIASTOLIC BLOOD PRESSURE: 60 MMHG | WEIGHT: 226 LBS | SYSTOLIC BLOOD PRESSURE: 118 MMHG

## 2024-03-06 DIAGNOSIS — B96.89 ACUTE BACTERIAL SINUSITIS: Primary | ICD-10-CM

## 2024-03-06 DIAGNOSIS — J01.90 ACUTE BACTERIAL SINUSITIS: Primary | ICD-10-CM

## 2024-03-06 PROCEDURE — 1123F ACP DISCUSS/DSCN MKR DOCD: CPT | Performed by: FAMILY MEDICINE

## 2024-03-06 PROCEDURE — 99213 OFFICE O/P EST LOW 20 MIN: CPT | Performed by: FAMILY MEDICINE

## 2024-03-06 PROCEDURE — 3074F SYST BP LT 130 MM HG: CPT | Performed by: FAMILY MEDICINE

## 2024-03-06 PROCEDURE — 3078F DIAST BP <80 MM HG: CPT | Performed by: FAMILY MEDICINE

## 2024-03-06 RX ORDER — PREDNISONE 10 MG/1
10 TABLET ORAL 2 TIMES DAILY
Qty: 10 TABLET | Refills: 0 | Status: SHIPPED | OUTPATIENT
Start: 2024-03-06 | End: 2024-03-11

## 2024-03-06 RX ORDER — DOXYCYCLINE 100 MG/1
100 TABLET ORAL 2 TIMES DAILY
Qty: 20 TABLET | Refills: 0 | Status: SHIPPED | OUTPATIENT
Start: 2024-03-06 | End: 2024-03-16

## 2024-03-06 RX ORDER — AZELASTINE 1 MG/ML
1 SPRAY, METERED NASAL 2 TIMES DAILY
Qty: 60 ML | Refills: 1 | Status: SHIPPED | OUTPATIENT
Start: 2024-03-06

## 2024-03-06 ASSESSMENT — ENCOUNTER SYMPTOMS
EYES NEGATIVE: 1
SORE THROAT: 1
RESPIRATORY NEGATIVE: 1
SINUS PRESSURE: 1
GASTROINTESTINAL NEGATIVE: 1
SINUS PAIN: 1
TROUBLE SWALLOWING: 0

## 2024-03-06 NOTE — PROGRESS NOTES
Ulises Chu (:  1950) is a 73 y.o. male,Established patient, here for evaluation of the following chief complaint(s):  Congestion (Took abx, no bettter ) and Hearing Problem (Thinks from sinus infection)         ASSESSMENT/PLAN:  1. Acute bacterial sinusitis  -     doxycycline monohydrate (ADOXA) 100 MG tablet; Take 1 tablet by mouth 2 times daily for 10 days, Disp-20 tablet, R-0Normal  -     azelastine (ASTELIN) 0.1 % nasal spray; 1 spray by Nasal route 2 times daily Use in each nostril as directed, Disp-60 mL, R-1Normal  -     predniSONE (DELTASONE) 10 MG tablet; Take 1 tablet by mouth 2 times daily for 5 days, Disp-10 tablet, R-0Normal    At this time we will treat symptomatically.  Follow-up with PCP in 1 to 2 weeks to ensure resolution.  Red flags discussed if these occur return to clinic.  No follow-ups on file.         Subjective   SUBJECTIVE/OBJECTIVE:  HPI  Patient presents today for recurrent congestion and ear fullness.  Recently was treated with cefdinir but states that after stopping the medication symptoms did return.  No fever or chills.  No chest pain or shortness of breath.  No nausea vomiting diarrhea.  No known sick contacts or recent travel.    Review of Systems   Constitutional:  Negative for fever.   HENT:  Positive for congestion, ear pain, postnasal drip, sinus pressure, sinus pain and sore throat. Negative for trouble swallowing.    Eyes: Negative.    Respiratory: Negative.     Cardiovascular: Negative.    Gastrointestinal: Negative.    Musculoskeletal:  Negative for neck pain.   Skin:  Negative for rash.   Neurological:  Negative for headaches.   Hematological:  Negative for adenopathy.   All other systems reviewed and are negative.         Current Outpatient Medications:     doxycycline monohydrate (ADOXA) 100 MG tablet, Take 1 tablet by mouth 2 times daily for 10 days, Disp: 20 tablet, Rfl: 0    azelastine (ASTELIN) 0.1 % nasal spray, 1 spray by Nasal route 2 times daily Use

## 2024-05-22 DIAGNOSIS — E11.42 TYPE 2 DIABETES MELLITUS WITH DIABETIC POLYNEUROPATHY, WITH LONG-TERM CURRENT USE OF INSULIN (HCC): ICD-10-CM

## 2024-05-22 DIAGNOSIS — E78.2 MIXED HYPERLIPIDEMIA: ICD-10-CM

## 2024-05-22 DIAGNOSIS — E55.9 VITAMIN D DEFICIENCY, UNSPECIFIED: ICD-10-CM

## 2024-05-22 DIAGNOSIS — C61 PRIMARY MALIGNANT NEOPLASM OF PROSTATE (HCC): ICD-10-CM

## 2024-05-22 DIAGNOSIS — E53.9 VITAMIN B DEFICIENCY: ICD-10-CM

## 2024-05-22 DIAGNOSIS — Z79.4 TYPE 2 DIABETES MELLITUS WITH DIABETIC POLYNEUROPATHY, WITH LONG-TERM CURRENT USE OF INSULIN (HCC): ICD-10-CM

## 2024-05-22 LAB
ALBUMIN: 4.4 G/DL (ref 3.5–5.2)
ALP BLD-CCNC: 47 U/L (ref 40–129)
ALT SERPL-CCNC: 21 U/L (ref 0–40)
ANION GAP SERPL CALCULATED.3IONS-SCNC: 13 MMOL/L (ref 7–16)
AST SERPL-CCNC: 23 U/L (ref 0–39)
BASOPHILS ABSOLUTE: 0.05 K/UL (ref 0–0.2)
BASOPHILS RELATIVE PERCENT: 1 % (ref 0–2)
BILIRUB SERPL-MCNC: 0.5 MG/DL (ref 0–1.2)
BILIRUBIN, URINE: NEGATIVE
BUN BLDV-MCNC: 24 MG/DL (ref 6–23)
CALCIUM SERPL-MCNC: 10.5 MG/DL (ref 8.6–10.2)
CHLORIDE BLD-SCNC: 109 MMOL/L (ref 98–107)
CHOLESTEROL, TOTAL: 92 MG/DL
CO2: 23 MMOL/L (ref 22–29)
COLOR: YELLOW
COMMENT: ABNORMAL
CREAT SERPL-MCNC: 0.9 MG/DL (ref 0.7–1.2)
CREATININE URINE: 132.2 MG/DL (ref 40–278)
EOSINOPHILS ABSOLUTE: 0.22 K/UL (ref 0.05–0.5)
EOSINOPHILS RELATIVE PERCENT: 4 % (ref 0–6)
FOLATE: 13.2 NG/ML (ref 4.8–24.2)
GFR, ESTIMATED: >90 ML/MIN/1.73M2
GLUCOSE BLD-MCNC: 108 MG/DL (ref 74–99)
GLUCOSE URINE: >=1000 MG/DL
HBA1C MFR BLD: 6.8 % (ref 4–5.6)
HCT VFR BLD CALC: 46.6 % (ref 37–54)
HDLC SERPL-MCNC: 20 MG/DL
HEMOGLOBIN: 14.8 G/DL (ref 12.5–16.5)
IMMATURE GRANULOCYTES %: 0 % (ref 0–5)
IMMATURE GRANULOCYTES ABSOLUTE: <0.03 K/UL (ref 0–0.58)
KETONES, URINE: NEGATIVE MG/DL
LDL CHOLESTEROL: 51 MG/DL
LEUKOCYTE ESTERASE, URINE: NEGATIVE
LYMPHOCYTES ABSOLUTE: 1.81 K/UL (ref 1.5–4)
LYMPHOCYTES RELATIVE PERCENT: 31 % (ref 20–42)
MCH RBC QN AUTO: 30.1 PG (ref 26–35)
MCHC RBC AUTO-ENTMCNC: 31.8 G/DL (ref 32–34.5)
MCV RBC AUTO: 94.9 FL (ref 80–99.9)
MICROALBUMIN/CREAT 24H UR: <12 MG/L (ref 0–19)
MICROALBUMIN/CREAT UR-RTO: NORMAL MCG/MG CREAT (ref 0–30)
MONOCYTES ABSOLUTE: 0.51 K/UL (ref 0.1–0.95)
MONOCYTES RELATIVE PERCENT: 9 % (ref 2–12)
NEUTROPHILS ABSOLUTE: 3.31 K/UL (ref 1.8–7.3)
NEUTROPHILS RELATIVE PERCENT: 56 % (ref 43–80)
NITRITE, URINE: NEGATIVE
PDW BLD-RTO: 14.2 % (ref 11.5–15)
PH, URINE: 5.5 (ref 5–9)
PLATELET, FLUORESCENCE: 204 K/UL (ref 130–450)
PMV BLD AUTO: 13.1 FL (ref 7–12)
POTASSIUM SERPL-SCNC: 4.6 MMOL/L (ref 3.5–5)
PROSTATE SPECIFIC ANTIGEN: <0.01 NG/ML (ref 0–4)
PROTEIN UA: NEGATIVE MG/DL
RBC # BLD: 4.91 M/UL (ref 3.8–5.8)
SODIUM BLD-SCNC: 145 MMOL/L (ref 132–146)
SPECIFIC GRAVITY UA: 1.02 (ref 1–1.03)
TOTAL CK: 337 U/L (ref 20–200)
TOTAL PROTEIN: 7.7 G/DL (ref 6.4–8.3)
TRIGL SERPL-MCNC: 107 MG/DL
TSH SERPL DL<=0.05 MIU/L-ACNC: 2.24 UIU/ML (ref 0.27–4.2)
TURBIDITY: CLEAR
URINE HGB: NEGATIVE
UROBILINOGEN, URINE: 0.2 EU/DL (ref 0–1)
VITAMIN B-12: 1528 PG/ML (ref 211–946)
VITAMIN D 25-HYDROXY: 36.9 NG/ML (ref 30–100)
VLDLC SERPL CALC-MCNC: 21 MG/DL
WBC # BLD: 5.9 K/UL (ref 4.5–11.5)

## 2024-05-28 ENCOUNTER — OFFICE VISIT (OUTPATIENT)
Dept: PRIMARY CARE CLINIC | Age: 74
End: 2024-05-28
Payer: MEDICARE

## 2024-05-28 VITALS
DIASTOLIC BLOOD PRESSURE: 62 MMHG | RESPIRATION RATE: 18 BRPM | HEART RATE: 77 BPM | WEIGHT: 226 LBS | OXYGEN SATURATION: 98 % | HEIGHT: 70 IN | BODY MASS INDEX: 32.35 KG/M2 | SYSTOLIC BLOOD PRESSURE: 130 MMHG | TEMPERATURE: 97.3 F

## 2024-05-28 VITALS
HEIGHT: 70 IN | BODY MASS INDEX: 32.35 KG/M2 | TEMPERATURE: 97.3 F | DIASTOLIC BLOOD PRESSURE: 62 MMHG | OXYGEN SATURATION: 98 % | RESPIRATION RATE: 18 BRPM | SYSTOLIC BLOOD PRESSURE: 130 MMHG | WEIGHT: 226 LBS | HEART RATE: 77 BPM

## 2024-05-28 DIAGNOSIS — Z79.4 LONG TERM CURRENT USE OF INSULIN (HCC): ICD-10-CM

## 2024-05-28 DIAGNOSIS — E78.2 MIXED HYPERLIPIDEMIA: ICD-10-CM

## 2024-05-28 DIAGNOSIS — E53.9 VITAMIN B DEFICIENCY: ICD-10-CM

## 2024-05-28 DIAGNOSIS — C61 PRIMARY MALIGNANT NEOPLASM OF PROSTATE (HCC): ICD-10-CM

## 2024-05-28 DIAGNOSIS — I10 HYPERTENSION, ESSENTIAL: ICD-10-CM

## 2024-05-28 DIAGNOSIS — Z00.00 MEDICARE ANNUAL WELLNESS VISIT, SUBSEQUENT: Primary | ICD-10-CM

## 2024-05-28 DIAGNOSIS — E83.52 HYPERCALCEMIA: ICD-10-CM

## 2024-05-28 DIAGNOSIS — R39.15 URGENCY OF URINATION: Primary | ICD-10-CM

## 2024-05-28 DIAGNOSIS — R19.5 LOOSE BOWEL MOVEMENTS: ICD-10-CM

## 2024-05-28 DIAGNOSIS — E11.42 TYPE 2 DIABETES MELLITUS WITH DIABETIC POLYNEUROPATHY, WITH LONG-TERM CURRENT USE OF INSULIN (HCC): ICD-10-CM

## 2024-05-28 DIAGNOSIS — E55.9 VITAMIN D DEFICIENCY, UNSPECIFIED: ICD-10-CM

## 2024-05-28 DIAGNOSIS — Z79.4 TYPE 2 DIABETES MELLITUS WITH DIABETIC POLYNEUROPATHY, WITH LONG-TERM CURRENT USE OF INSULIN (HCC): ICD-10-CM

## 2024-05-28 PROCEDURE — 3078F DIAST BP <80 MM HG: CPT | Performed by: FAMILY MEDICINE

## 2024-05-28 PROCEDURE — 3044F HG A1C LEVEL LT 7.0%: CPT | Performed by: FAMILY MEDICINE

## 2024-05-28 PROCEDURE — 99214 OFFICE O/P EST MOD 30 MIN: CPT | Performed by: FAMILY MEDICINE

## 2024-05-28 PROCEDURE — 1123F ACP DISCUSS/DSCN MKR DOCD: CPT | Performed by: FAMILY MEDICINE

## 2024-05-28 PROCEDURE — G0439 PPPS, SUBSEQ VISIT: HCPCS | Performed by: FAMILY MEDICINE

## 2024-05-28 PROCEDURE — 3075F SYST BP GE 130 - 139MM HG: CPT | Performed by: FAMILY MEDICINE

## 2024-05-28 SDOH — ECONOMIC STABILITY: FOOD INSECURITY: WITHIN THE PAST 12 MONTHS, THE FOOD YOU BOUGHT JUST DIDN'T LAST AND YOU DIDN'T HAVE MONEY TO GET MORE.: NEVER TRUE

## 2024-05-28 SDOH — ECONOMIC STABILITY: FOOD INSECURITY: WITHIN THE PAST 12 MONTHS, YOU WORRIED THAT YOUR FOOD WOULD RUN OUT BEFORE YOU GOT MONEY TO BUY MORE.: NEVER TRUE

## 2024-05-28 SDOH — ECONOMIC STABILITY: INCOME INSECURITY: HOW HARD IS IT FOR YOU TO PAY FOR THE VERY BASICS LIKE FOOD, HOUSING, MEDICAL CARE, AND HEATING?: NOT HARD AT ALL

## 2024-05-28 ASSESSMENT — PATIENT HEALTH QUESTIONNAIRE - PHQ9
SUM OF ALL RESPONSES TO PHQ QUESTIONS 1-9: 0
1. LITTLE INTEREST OR PLEASURE IN DOING THINGS: NOT AT ALL
2. FEELING DOWN, DEPRESSED OR HOPELESS: NOT AT ALL
SUM OF ALL RESPONSES TO PHQ QUESTIONS 1-9: 0
SUM OF ALL RESPONSES TO PHQ QUESTIONS 1-9: 0
SUM OF ALL RESPONSES TO PHQ9 QUESTIONS 1 & 2: 0
SUM OF ALL RESPONSES TO PHQ QUESTIONS 1-9: 0

## 2024-05-28 ASSESSMENT — LIFESTYLE VARIABLES
HOW MANY STANDARD DRINKS CONTAINING ALCOHOL DO YOU HAVE ON A TYPICAL DAY: PATIENT DOES NOT DRINK
HOW OFTEN DO YOU HAVE A DRINK CONTAINING ALCOHOL: NEVER

## 2024-05-28 NOTE — PROGRESS NOTES
Ulises Chu : 1950 Sex: male  Age: 73 y.o.    Chief Complaint   Patient presents with    Diabetes       HPI:    Very pleasant patient presents today for follow-up labs.  Also noting some loose bowels with urgency at times, no melena or hematochezia.  See annual wellness visit in regard to colon cancer screening.  Also some urinary urgency.  Has not seen urology in a while.    Labs are overall stable, CMP  Shows chloride 109 BUN 24 glucose 108 calcium 10.5 B12 1528 folic acid 13.2 HDL 20 LDL 51 triglyceride 107 hemoglobin A1c 6.8 vitamin D 36 TSH 2.24 CBC grossly normal differential reviewed PSA less than 0.01 urinalysis negative microalbumin creatinine ratio-NC      Most Recent Labs  CBC  Lab Results   Component Value Date/Time    WBC 5.9 2024 08:23 AM    WBC 7.4 2023 08:49 AM    WBC 6.3 2023 08:46 AM    RBC 4.91 2024 08:23 AM    RBC 4.91 2023 08:49 AM    RBC 5.11 2023 08:46 AM    HGB 14.8 2024 08:23 AM    HGB 14.7 2023 08:49 AM    HGB 15.2 2023 08:46 AM    HCT 46.6 2024 08:23 AM    HCT 46.6 2023 08:49 AM    HCT 48.1 2023 08:46 AM    MCV 94.9 2024 08:23 AM    MCV 94.9 2023 08:49 AM    MCV 94.1 2023 08:46 AM     2023 08:49 AM     2023 08:46 AM     2023 08:55 AM      CMP  Lab Results   Component Value Date/Time     2024 08:23 AM     2023 08:49 AM     2023 08:46 AM    K 4.6 2024 08:23 AM    K 4.1 2023 08:49 AM    K 5.0 2023 08:46 AM     2024 08:23 AM     2023 08:49 AM     2023 08:46 AM    CO2 23 2024 08:23 AM    CO2 26 2023 08:49 AM    CO2 22 2023 08:46 AM    ANIONGAP 13 2024 08:23 AM    ANIONGAP 11 2023 08:49 AM    ANIONGAP 18 2023 08:46 AM    GLUCOSE 108 2024 08:23 AM    GLUCOSE 142 2023 08:49 AM    GLUCOSE 148 2023 08:46 AM    BUN 24

## 2024-05-28 NOTE — PROGRESS NOTES
Medicare Annual Wellness Visit    Ulises Chu is here for Medicare AW    Assessment & Plan   Assessment and Plan:   Diagnosis Orders   1. Medicare annual wellness visit, subsequent               No problem-specific Assessment & Plan notes found for this encounter.       Plan as above.  Counseled extensively and differential diagnoses relevant to above were reviewed, including serious etiologies.   Side effects and interactions of medications were reviewed.              As long as symptoms steadily improve/resolve, and medical conditions follow the expected course, FU as below, as previously directed and sooner PRN.    Return for Medicare Annual Wellness Visit in 1 year.        Signs and symptoms to watch for discussed, serious signs and symptoms reviewed.  ER if any.         Tomas Beltran MD    Patients are advised to check with insurance company to ensure coverage and to fully understand benefits and cost prior to any testing.  This note was created with the assistance of voice recognition software.  Document was reviewed however may contain grammatical errors.    Recommendations for Preventive Services Due: see orders and patient instructions/AVS.  Recommended screening schedule for the next 5-10 years is provided to the patient in written form: see Patient Instructions/AVS.     Return for Medicare Annual Wellness Visit in 1 year.     Subjective           Health Maintenance: Well balanced/proper diet reviewed. Counseled on MVI, fiber, b-complex, calcium  and fish oils (including pros/cons of OTC vs Rx). Exercise recommendations reviewed. Reviewed  recommendations regarding RSV vaccine, moderna x 4, counseled on booster;  Td (Tdap) (4/2011 - declines booster, encouraged ck with pharm), pneumovax (3/15, 9/20)   prevnar  13 (3/19), flu shot (seasonal), Hepatitis vaccines and shingles vaccine (had zostavax, had   shingrix x 2). Importance of Regular Eye and Dental exams reviewed, Risks/Benefits of ASA reviewed

## 2024-08-14 NOTE — ASSESSMENT & PLAN NOTE
[facility] - [address] [facility] [address]   Phone: [phone]       [address]  [place] [address]   Phone: [phone]       Next Steps: Follow up    Instructions: Dr. Stanton's office will call and schedule hospital follow up             chronic, stable, normal now. Counseled extensively. Differential reviewed, including  serious etiologies. Declines further evaluation/treatment. Had colonoscopy 4/14 dr Charlee Rodriguez; small patch of telengectasia. recommended repeat 5-7 yrs . He is in no  hurry . He has been stable. Declines FIT or cscope before 2021.

## 2024-08-22 DIAGNOSIS — I10 HYPERTENSION, ESSENTIAL: ICD-10-CM

## 2024-08-22 RX ORDER — LISINOPRIL 20 MG/1
20 TABLET ORAL DAILY
Qty: 90 TABLET | Refills: 1 | Status: SHIPPED | OUTPATIENT
Start: 2024-08-22

## 2024-08-22 NOTE — TELEPHONE ENCOUNTER
Medication and pharmacy info verified with the pt     Last Appointment:  5/28/2024    Future appts:  Future Appointments   Date Time Provider Department Center   11/25/2024 11:00 AM Tomas Beltran MD [facility]

## 2024-09-03 ENCOUNTER — OFFICE VISIT (OUTPATIENT)
Dept: FAMILY MEDICINE CLINIC | Age: 74
End: 2024-09-03
Payer: MEDICARE

## 2024-09-03 VITALS
OXYGEN SATURATION: 97 % | HEIGHT: 70 IN | WEIGHT: 224 LBS | HEART RATE: 88 BPM | BODY MASS INDEX: 32.07 KG/M2 | DIASTOLIC BLOOD PRESSURE: 60 MMHG | TEMPERATURE: 98.2 F | SYSTOLIC BLOOD PRESSURE: 124 MMHG | RESPIRATION RATE: 18 BRPM

## 2024-09-03 DIAGNOSIS — H60.502 ACUTE OTITIS EXTERNA OF LEFT EAR, UNSPECIFIED TYPE: Primary | ICD-10-CM

## 2024-09-03 PROCEDURE — 3074F SYST BP LT 130 MM HG: CPT

## 2024-09-03 PROCEDURE — 99213 OFFICE O/P EST LOW 20 MIN: CPT

## 2024-09-03 PROCEDURE — 3078F DIAST BP <80 MM HG: CPT

## 2024-09-03 PROCEDURE — 1123F ACP DISCUSS/DSCN MKR DOCD: CPT

## 2024-09-03 RX ORDER — OFLOXACIN 3 MG/ML
10 SOLUTION AURICULAR (OTIC) DAILY
Qty: 5 ML | Refills: 0 | Status: SHIPPED | OUTPATIENT
Start: 2024-09-03 | End: 2024-09-13

## 2024-09-03 NOTE — PROGRESS NOTES
History: family history includes Diabetes in his father and mother; Heart Disease in his father and mother.  Allergies: Patient has no known allergies.    Physical Exam   Vital Signs:   Vitals:    09/03/24 0903   BP: 124/60   Pulse: 88   Temp: 98.2 °F (36.8 °C)   SpO2: 97%   Weight: 101.6 kg (224 lb)   Height: 1.772 m (5' 9.76\")     Oxygen Saturation Interpretation: Normal.    Constitutional:  Alert, development consistent with age.  Ears:  External Ears: Normal pinna bilaterally.                 TM's & External Canals: Left external canal is with edema and erythema.  Severe tenderness when palpating the tragus.  Right external canal is without edema, erythema or drainage.  Left TM is of abnormal dark pigmentation, however, no bulge, effusion, or perforation.  The left he and is without erythema, bulge, effusion or perforation.  Nose: Negative congestion of the nasal mucosa.  Throat:  Posterior pharynx without injection, exudate, or tonsillar hypertrophy.  Airway patient.  Neck:  Normal ROM.  Supple.  No adenopathy.    Respiratory:  CTAB without wheezing, rales, or rhonchi  CV: Regular rate and rhythm, normal heart sounds, without pathological murmurs, ectopy, gallops, or rubs.  Skin:  Moist and warm without rashes or lesions.  Lymphatic: No lymphangitis or adenopathy noted.  Neurological:  Oriented.  Motor functions intact.    Test Results Section   (All laboratory and radiology results have been personally reviewed by myself)  Labs:  No results found for this visit on 09/03/24.    Assessment / Plan     Impression(s):  Ulises was seen today for otalgia.    Diagnoses and all orders for this visit:    Acute otitis externa of left ear, unspecified type  -     ofloxacin (FLOXIN) 0.3 % otic solution; Place 10 drops in ear(s) daily for 10 days    Script written for Ofloxacin, side effects and administration discussed.  Patient expressed understanding.  Increase fluids and rest. Additional symptomatic relief discussed.

## 2024-09-18 ENCOUNTER — OFFICE VISIT (OUTPATIENT)
Dept: FAMILY MEDICINE CLINIC | Age: 74
End: 2024-09-18
Payer: MEDICARE

## 2024-09-18 VITALS
SYSTOLIC BLOOD PRESSURE: 122 MMHG | HEIGHT: 70 IN | OXYGEN SATURATION: 98 % | DIASTOLIC BLOOD PRESSURE: 68 MMHG | BODY MASS INDEX: 32.07 KG/M2 | WEIGHT: 224 LBS | HEART RATE: 96 BPM | TEMPERATURE: 96.8 F

## 2024-09-18 DIAGNOSIS — H60.502 ACUTE OTITIS EXTERNA OF LEFT EAR, UNSPECIFIED TYPE: Primary | ICD-10-CM

## 2024-09-18 PROCEDURE — 3078F DIAST BP <80 MM HG: CPT | Performed by: PHYSICIAN ASSISTANT

## 2024-09-18 PROCEDURE — 99214 OFFICE O/P EST MOD 30 MIN: CPT | Performed by: PHYSICIAN ASSISTANT

## 2024-09-18 PROCEDURE — 1123F ACP DISCUSS/DSCN MKR DOCD: CPT | Performed by: PHYSICIAN ASSISTANT

## 2024-09-18 PROCEDURE — 3074F SYST BP LT 130 MM HG: CPT | Performed by: PHYSICIAN ASSISTANT

## 2024-09-18 RX ORDER — CIPROFLOXACIN AND DEXAMETHASONE 3; 1 MG/ML; MG/ML
4 SUSPENSION/ DROPS AURICULAR (OTIC) 2 TIMES DAILY
Qty: 7.5 ML | Refills: 0 | Status: SHIPPED | OUTPATIENT
Start: 2024-09-18 | End: 2024-09-25

## 2024-10-22 DIAGNOSIS — E78.2 MIXED HYPERLIPIDEMIA: ICD-10-CM

## 2024-10-22 RX ORDER — ATORVASTATIN CALCIUM 40 MG/1
40 TABLET, FILM COATED ORAL DAILY
Qty: 90 TABLET | Refills: 3 | Status: SHIPPED | OUTPATIENT
Start: 2024-10-22

## 2024-10-22 NOTE — TELEPHONE ENCOUNTER
Medication dose, strength and pharmacy confirmed with patient.   Reminded patient of appointment for November.

## 2024-11-11 DIAGNOSIS — I10 HYPERTENSION, ESSENTIAL: ICD-10-CM

## 2024-11-11 RX ORDER — METOPROLOL SUCCINATE 25 MG/1
25 TABLET, EXTENDED RELEASE ORAL DAILY
Qty: 90 TABLET | Refills: 3 | Status: SHIPPED | OUTPATIENT
Start: 2024-11-11

## 2024-11-11 NOTE — TELEPHONE ENCOUNTER
Medication dose, strength and pharmacy confirmed with patient. Patient also reminded of upcoming appointment.

## 2024-11-21 DIAGNOSIS — E11.42 TYPE 2 DIABETES MELLITUS WITH DIABETIC POLYNEUROPATHY, WITH LONG-TERM CURRENT USE OF INSULIN (HCC): ICD-10-CM

## 2024-11-21 DIAGNOSIS — E53.9 VITAMIN B DEFICIENCY: ICD-10-CM

## 2024-11-21 DIAGNOSIS — Z79.4 TYPE 2 DIABETES MELLITUS WITH DIABETIC POLYNEUROPATHY, WITH LONG-TERM CURRENT USE OF INSULIN (HCC): ICD-10-CM

## 2024-11-21 DIAGNOSIS — E55.9 VITAMIN D DEFICIENCY, UNSPECIFIED: ICD-10-CM

## 2024-11-21 DIAGNOSIS — E78.2 MIXED HYPERLIPIDEMIA: ICD-10-CM

## 2024-11-21 DIAGNOSIS — E87.5 HYPERKALEMIA: Primary | ICD-10-CM

## 2024-11-21 DIAGNOSIS — C61 PRIMARY MALIGNANT NEOPLASM OF PROSTATE (HCC): ICD-10-CM

## 2024-11-21 LAB
ALBUMIN: 4.4 G/DL (ref 3.5–5.2)
ALP BLD-CCNC: 55 U/L (ref 40–129)
ALT SERPL-CCNC: 22 U/L (ref 0–40)
ANION GAP SERPL CALCULATED.3IONS-SCNC: 16 MMOL/L (ref 7–16)
AST SERPL-CCNC: 20 U/L (ref 0–39)
BASOPHILS ABSOLUTE: 0.06 K/UL (ref 0–0.2)
BASOPHILS RELATIVE PERCENT: 1 % (ref 0–2)
BILIRUB SERPL-MCNC: 0.6 MG/DL (ref 0–1.2)
BILIRUBIN, URINE: NEGATIVE
BUN BLDV-MCNC: 23 MG/DL (ref 6–23)
CALCIUM SERPL-MCNC: 10.6 MG/DL (ref 8.6–10.2)
CHLORIDE BLD-SCNC: 105 MMOL/L (ref 98–107)
CHOLESTEROL, TOTAL: 108 MG/DL
CO2: 24 MMOL/L (ref 22–29)
COLOR, UA: YELLOW
CREAT SERPL-MCNC: 1.1 MG/DL (ref 0.7–1.2)
CREATININE URINE: 129.6 MG/DL (ref 40–278)
EOSINOPHILS ABSOLUTE: 0.1 K/UL (ref 0.05–0.5)
EOSINOPHILS RELATIVE PERCENT: 2 % (ref 0–6)
FOLATE: >20 NG/ML (ref 4.8–24.2)
GFR, ESTIMATED: 73 ML/MIN/1.73M2
GLUCOSE BLD-MCNC: 119 MG/DL (ref 74–99)
GLUCOSE URINE: >=1000 MG/DL
HBA1C MFR BLD: 7 % (ref 4–5.6)
HCT VFR BLD CALC: 47.3 % (ref 37–54)
HDLC SERPL-MCNC: 20 MG/DL
HEMOGLOBIN: 15.2 G/DL (ref 12.5–16.5)
IMMATURE GRANULOCYTES %: 0 % (ref 0–5)
IMMATURE GRANULOCYTES ABSOLUTE: <0.03 K/UL (ref 0–0.58)
KETONES, URINE: NEGATIVE MG/DL
LDL CHOLESTEROL: 53 MG/DL
LEUKOCYTE ESTERASE, URINE: NEGATIVE
LYMPHOCYTES ABSOLUTE: 1.95 K/UL (ref 1.5–4)
LYMPHOCYTES RELATIVE PERCENT: 31 % (ref 20–42)
MCH RBC QN AUTO: 30.2 PG (ref 26–35)
MCHC RBC AUTO-ENTMCNC: 32.1 G/DL (ref 32–34.5)
MCV RBC AUTO: 94 FL (ref 80–99.9)
MICROALBUMIN/CREAT 24H UR: 27 MG/L (ref 0–19)
MICROALBUMIN/CREAT UR-RTO: 21 MCG/MG CREAT (ref 0–30)
MONOCYTES ABSOLUTE: 0.47 K/UL (ref 0.1–0.95)
MONOCYTES RELATIVE PERCENT: 8 % (ref 2–12)
NEUTROPHILS ABSOLUTE: 3.61 K/UL (ref 1.8–7.3)
NEUTROPHILS RELATIVE PERCENT: 58 % (ref 43–80)
NITRITE, URINE: NEGATIVE
PDW BLD-RTO: 14.1 % (ref 11.5–15)
PH, URINE: 5.5 (ref 5–9)
PLATELET # BLD: 217 K/UL (ref 130–450)
PMV BLD AUTO: 12.3 FL (ref 7–12)
POTASSIUM SERPL-SCNC: 5.3 MMOL/L (ref 3.5–5)
PROSTATE SPECIFIC ANTIGEN: <0.01 NG/ML (ref 0–4)
PROTEIN UA: NEGATIVE MG/DL
RBC # BLD: 5.03 M/UL (ref 3.8–5.8)
SODIUM BLD-SCNC: 145 MMOL/L (ref 132–146)
SPECIFIC GRAVITY UA: 1.02 (ref 1–1.03)
T4 FREE: 1.1 NG/DL (ref 0.9–1.7)
TOTAL CK: 205 U/L (ref 20–200)
TOTAL PROTEIN: 7.5 G/DL (ref 6.4–8.3)
TRIGL SERPL-MCNC: 174 MG/DL
TSH SERPL DL<=0.05 MIU/L-ACNC: 1.68 UIU/ML (ref 0.27–4.2)
TURBIDITY: CLEAR
URINE HGB: NEGATIVE
UROBILINOGEN, URINE: 0.2 EU/DL (ref 0–1)
VITAMIN B-12: 1934 PG/ML (ref 211–946)
VITAMIN D 25-HYDROXY: 32.1 NG/ML (ref 30–100)
VLDLC SERPL CALC-MCNC: 35 MG/DL
WBC # BLD: 6.2 K/UL (ref 4.5–11.5)

## 2024-11-21 NOTE — RESULT ENCOUNTER NOTE
Potassium elevated, often a false positive at this lab, encourage proper hydration, avoid potassium supplements or excess intake I do recommend a repeat nonfasting to be safe.  Keep follow-up to review more detail sooner as needed.

## 2024-11-25 ENCOUNTER — OFFICE VISIT (OUTPATIENT)
Dept: PRIMARY CARE CLINIC | Age: 74
End: 2024-11-25

## 2024-11-25 VITALS
HEIGHT: 70 IN | BODY MASS INDEX: 32.93 KG/M2 | TEMPERATURE: 97.6 F | DIASTOLIC BLOOD PRESSURE: 60 MMHG | SYSTOLIC BLOOD PRESSURE: 132 MMHG | HEART RATE: 64 BPM | OXYGEN SATURATION: 97 % | WEIGHT: 230 LBS

## 2024-11-25 DIAGNOSIS — E55.9 VITAMIN D DEFICIENCY, UNSPECIFIED: ICD-10-CM

## 2024-11-25 DIAGNOSIS — Z23 ENCOUNTER FOR IMMUNIZATION: ICD-10-CM

## 2024-11-25 DIAGNOSIS — E11.42 TYPE 2 DIABETES MELLITUS WITH DIABETIC POLYNEUROPATHY, WITH LONG-TERM CURRENT USE OF INSULIN (HCC): ICD-10-CM

## 2024-11-25 DIAGNOSIS — E78.2 MIXED HYPERLIPIDEMIA: ICD-10-CM

## 2024-11-25 DIAGNOSIS — C61 PRIMARY MALIGNANT NEOPLASM OF PROSTATE (HCC): ICD-10-CM

## 2024-11-25 DIAGNOSIS — E53.9 VITAMIN B DEFICIENCY: ICD-10-CM

## 2024-11-25 DIAGNOSIS — D07.5 CARCINOMA IN SITU OF PROSTATE: ICD-10-CM

## 2024-11-25 DIAGNOSIS — Z79.4 TYPE 2 DIABETES MELLITUS WITH DIABETIC POLYNEUROPATHY, WITH LONG-TERM CURRENT USE OF INSULIN (HCC): ICD-10-CM

## 2024-11-25 DIAGNOSIS — E83.52 HYPERCALCEMIA: ICD-10-CM

## 2024-11-25 DIAGNOSIS — I10 HYPERTENSION, ESSENTIAL: Primary | ICD-10-CM

## 2024-11-25 RX ORDER — ACETAMINOPHEN 500 MG
500 TABLET ORAL EVERY 6 HOURS PRN
COMMUNITY

## 2024-11-25 NOTE — PROGRESS NOTES
6. Primary malignant neoplasm of prostate (HCC)        7. Vitamin D deficiency, unspecified  Vitamin D 25 Hydroxy      8. Vitamin B deficiency  Vitamin B12 & Folate      9. Carcinoma in situ of prostate  PSA, Diagnostic                  No problem-specific Assessment & Plan notes found for this encounter.         Type 2 diabetes mellitus with complication (HCC)  Stable, although A1c higher at 7.0.  He is on metformin 1000 twice a day but we will hold this with loose bowels as above, sugars may need further addressed, maintain lowest effective dose, also on Farxiga and insulin.  Standard precautions.  Following with Dr. Pena. Watch BS closely ambulatory. Parameters given. macro  and microvascular complications reviewed.Call if not in range. ER if dangerous  numbers. hyper and hypoglycemic precautions reviewed. con't per Dr Pena.  Discussed regular eye exams, daily foot examinations.  on high  dose insulin. risk of hypoglycemia and tx reviewed. On quinapril, risks benefits  reviewed hemoglobin A1c 7.1-7.3-7.5-7.6-7.1-6.8-6.7-6.7-6.4-6.8-7.0, continue lifestyle modification.    Hyperkalemia  Counseled.  Counseled on TP/FP and risk of abnormality.  Encourage proper hydration avoid potassium supplements or excess intake.  Defers repeat before 4 months or otherwise unless symptoms.     Mixed hyperlipidemia  Counseled, on atorvastatin 40 mg daily with standard precautions.  HDL chronically low, LDL at goal triglycerides stable.  Associations with low LDL reviewed.  He would like to continue current therapy.lifestyle modification reviewed. risk of  hyperlipidemia reviewed tolerating therapy.  Niaspan and fenofibrate was previously discontinued, Niaspan because of cost. Could consider  fenofibrate if triglycerides dictate, defers change.  Continue current therapy        Hypertension, essential  Stable.  Watch ambulatory, parameters given. If out of range, notify. If dangerous numbers,  directly to ER. Risk of HTN

## 2025-02-10 DIAGNOSIS — I10 HYPERTENSION, ESSENTIAL: ICD-10-CM

## 2025-02-10 RX ORDER — LISINOPRIL 20 MG/1
20 TABLET ORAL DAILY
Qty: 90 TABLET | Refills: 1 | Status: SHIPPED | OUTPATIENT
Start: 2025-02-10

## 2025-02-10 NOTE — TELEPHONE ENCOUNTER
Name of Medication(s) Requested:  Requested Prescriptions     Pending Prescriptions Disp Refills    lisinopril (PRINIVIL;ZESTRIL) 20 MG tablet 90 tablet 1     Sig: Take 1 tablet by mouth daily       Medication is on current medication list Yes    Dosage and directions were verified? Yes    Quantity verified: 90 day supply     Pharmacy Verified?  Yes    Last Appointment:  11/25/2024    Future appts:  Future Appointments   Date Time Provider Department Center   3/25/2025 11:00 AM Tomas Beltran MD [facility] DE[facility]        (If no appt send self scheduling link. .REFILLAPPT)  Scheduling request sent?     [] Yes  [x] No    Does patient need updated?  [] Yes  [x] No

## 2025-02-28 LAB — DIABETIC RETINOPATHY: NORMAL

## 2025-03-19 ENCOUNTER — RESULTS FOLLOW-UP (OUTPATIENT)
Dept: PRIMARY CARE CLINIC | Age: 75
End: 2025-03-19

## 2025-03-19 DIAGNOSIS — E11.42 TYPE 2 DIABETES MELLITUS WITH DIABETIC POLYNEUROPATHY, WITH LONG-TERM CURRENT USE OF INSULIN (HCC): ICD-10-CM

## 2025-03-19 DIAGNOSIS — E78.2 MIXED HYPERLIPIDEMIA: ICD-10-CM

## 2025-03-19 DIAGNOSIS — E53.9 VITAMIN B DEFICIENCY: ICD-10-CM

## 2025-03-19 DIAGNOSIS — I10 HYPERTENSION, ESSENTIAL: ICD-10-CM

## 2025-03-19 DIAGNOSIS — D07.5 CARCINOMA IN SITU OF PROSTATE: ICD-10-CM

## 2025-03-19 DIAGNOSIS — Z79.4 TYPE 2 DIABETES MELLITUS WITH DIABETIC POLYNEUROPATHY, WITH LONG-TERM CURRENT USE OF INSULIN (HCC): ICD-10-CM

## 2025-03-19 DIAGNOSIS — E55.9 VITAMIN D DEFICIENCY, UNSPECIFIED: ICD-10-CM

## 2025-03-19 LAB
ALBUMIN: 4.4 G/DL (ref 3.5–5.2)
ALP BLD-CCNC: 59 U/L (ref 40–129)
ALT SERPL-CCNC: 25 U/L (ref 0–40)
ANION GAP SERPL CALCULATED.3IONS-SCNC: 19 MMOL/L (ref 7–16)
AST SERPL-CCNC: 22 U/L (ref 0–39)
BASOPHILS ABSOLUTE: 0.06 K/UL (ref 0–0.2)
BASOPHILS RELATIVE PERCENT: 1 % (ref 0–2)
BILIRUB SERPL-MCNC: 0.5 MG/DL (ref 0–1.2)
BUN BLDV-MCNC: 19 MG/DL (ref 6–23)
CALCIUM SERPL-MCNC: 10.5 MG/DL (ref 8.6–10.2)
CHLORIDE BLD-SCNC: 105 MMOL/L (ref 98–107)
CHOLESTEROL, TOTAL: 93 MG/DL
CO2: 21 MMOL/L (ref 22–29)
CREAT SERPL-MCNC: 1 MG/DL (ref 0.7–1.2)
CREATININE URINE: 79.8 MG/DL (ref 40–278)
EOSINOPHILS ABSOLUTE: 0.12 K/UL (ref 0.05–0.5)
EOSINOPHILS RELATIVE PERCENT: 2 % (ref 0–6)
FOLATE: >20 NG/ML (ref 4.8–24.2)
GFR, ESTIMATED: 78 ML/MIN/1.73M2
GLUCOSE BLD-MCNC: 97 MG/DL (ref 74–99)
HBA1C MFR BLD: 6.9 % (ref 4–5.6)
HCT VFR BLD CALC: 47 % (ref 37–54)
HDLC SERPL-MCNC: 18 MG/DL
HEMOGLOBIN: 15.3 G/DL (ref 12.5–16.5)
IMMATURE GRANULOCYTES %: 0 % (ref 0–5)
IMMATURE GRANULOCYTES ABSOLUTE: <0.03 K/UL (ref 0–0.58)
LDL CHOLESTEROL: 43 MG/DL
LYMPHOCYTES ABSOLUTE: 2.08 K/UL (ref 1.5–4)
LYMPHOCYTES RELATIVE PERCENT: 32 % (ref 20–42)
MCH RBC QN AUTO: 30.3 PG (ref 26–35)
MCHC RBC AUTO-ENTMCNC: 32.6 G/DL (ref 32–34.5)
MCV RBC AUTO: 93.1 FL (ref 80–99.9)
MICROALBUMIN/CREAT 24H UR: <12 MG/L (ref 0–19)
MICROALBUMIN/CREAT UR-RTO: NORMAL MCG/MG CREAT (ref 0–30)
MONOCYTES ABSOLUTE: 0.49 K/UL (ref 0.1–0.95)
MONOCYTES RELATIVE PERCENT: 8 % (ref 2–12)
NEUTROPHILS ABSOLUTE: 3.71 K/UL (ref 1.8–7.3)
NEUTROPHILS RELATIVE PERCENT: 57 % (ref 43–80)
PDW BLD-RTO: 14.2 % (ref 11.5–15)
PLATELET # BLD: 206 K/UL (ref 130–450)
PMV BLD AUTO: 13 FL (ref 7–12)
POTASSIUM SERPL-SCNC: 4.3 MMOL/L (ref 3.5–5)
PROSTATE SPECIFIC ANTIGEN: <0.01 NG/ML (ref 0–4)
RBC # BLD: 5.05 M/UL (ref 3.8–5.8)
SODIUM BLD-SCNC: 145 MMOL/L (ref 132–146)
TOTAL CK: 251 U/L (ref 20–200)
TOTAL PROTEIN: 7.8 G/DL (ref 6.4–8.3)
TRIGL SERPL-MCNC: 160 MG/DL
TSH SERPL DL<=0.05 MIU/L-ACNC: 2.33 UIU/ML (ref 0.27–4.2)
VITAMIN B-12: 1541 PG/ML (ref 211–946)
VITAMIN D 25-HYDROXY: 38.1 NG/ML (ref 30–100)
VLDLC SERPL CALC-MCNC: 32 MG/DL
WBC # BLD: 6.5 K/UL (ref 4.5–11.5)

## 2025-03-20 LAB
BACTERIA: ABNORMAL
BILIRUBIN, URINE: NEGATIVE
COLOR, UA: YELLOW
GLUCOSE URINE: 500 MG/DL
KETONES, URINE: NEGATIVE MG/DL
LEUKOCYTE ESTERASE, URINE: NEGATIVE
NITRITE, URINE: NEGATIVE
PH, URINE: 6 (ref 5–8)
PROTEIN UA: NEGATIVE MG/DL
RBC UA: ABNORMAL /HPF
SPECIFIC GRAVITY UA: 1.02 (ref 1–1.03)
TURBIDITY: CLEAR
URINE HGB: ABNORMAL
UROBILINOGEN, URINE: 0.2 EU/DL (ref 0–1)
WBC UA: ABNORMAL /HPF

## 2025-03-25 ENCOUNTER — OFFICE VISIT (OUTPATIENT)
Dept: PRIMARY CARE CLINIC | Age: 75
End: 2025-03-25
Payer: MEDICARE

## 2025-03-25 VITALS
HEIGHT: 70 IN | WEIGHT: 235 LBS | RESPIRATION RATE: 18 BRPM | OXYGEN SATURATION: 97 % | DIASTOLIC BLOOD PRESSURE: 60 MMHG | BODY MASS INDEX: 33.64 KG/M2 | SYSTOLIC BLOOD PRESSURE: 120 MMHG | HEART RATE: 74 BPM | TEMPERATURE: 97.7 F

## 2025-03-25 VITALS
BODY MASS INDEX: 33.96 KG/M2 | HEART RATE: 74 BPM | WEIGHT: 235 LBS | SYSTOLIC BLOOD PRESSURE: 120 MMHG | DIASTOLIC BLOOD PRESSURE: 60 MMHG

## 2025-03-25 DIAGNOSIS — Z00.00 MEDICARE ANNUAL WELLNESS VISIT, SUBSEQUENT: Primary | ICD-10-CM

## 2025-03-25 DIAGNOSIS — E83.52 HYPERCALCEMIA: ICD-10-CM

## 2025-03-25 DIAGNOSIS — H69.93 DISORDER OF BOTH EUSTACHIAN TUBES: ICD-10-CM

## 2025-03-25 DIAGNOSIS — E55.9 VITAMIN D DEFICIENCY, UNSPECIFIED: ICD-10-CM

## 2025-03-25 DIAGNOSIS — Z79.4 LONG TERM CURRENT USE OF INSULIN (HCC): ICD-10-CM

## 2025-03-25 DIAGNOSIS — D07.5 CARCINOMA IN SITU OF PROSTATE: ICD-10-CM

## 2025-03-25 DIAGNOSIS — E53.9 VITAMIN B DEFICIENCY: ICD-10-CM

## 2025-03-25 DIAGNOSIS — C61 PRIMARY MALIGNANT NEOPLASM OF PROSTATE (HCC): ICD-10-CM

## 2025-03-25 DIAGNOSIS — E11.42 TYPE 2 DIABETES MELLITUS WITH DIABETIC POLYNEUROPATHY, WITH LONG-TERM CURRENT USE OF INSULIN (HCC): Primary | ICD-10-CM

## 2025-03-25 DIAGNOSIS — E78.2 MIXED HYPERLIPIDEMIA: ICD-10-CM

## 2025-03-25 DIAGNOSIS — H91.93 BILATERAL HEARING LOSS, UNSPECIFIED HEARING LOSS TYPE: ICD-10-CM

## 2025-03-25 DIAGNOSIS — Z79.4 TYPE 2 DIABETES MELLITUS WITH DIABETIC POLYNEUROPATHY, WITH LONG-TERM CURRENT USE OF INSULIN (HCC): Primary | ICD-10-CM

## 2025-03-25 DIAGNOSIS — I10 HYPERTENSION, ESSENTIAL: ICD-10-CM

## 2025-03-25 PROCEDURE — G0439 PPPS, SUBSEQ VISIT: HCPCS | Performed by: FAMILY MEDICINE

## 2025-03-25 PROCEDURE — 3044F HG A1C LEVEL LT 7.0%: CPT | Performed by: FAMILY MEDICINE

## 2025-03-25 PROCEDURE — 99214 OFFICE O/P EST MOD 30 MIN: CPT | Performed by: FAMILY MEDICINE

## 2025-03-25 PROCEDURE — G2211 COMPLEX E/M VISIT ADD ON: HCPCS | Performed by: FAMILY MEDICINE

## 2025-03-25 PROCEDURE — 3074F SYST BP LT 130 MM HG: CPT | Performed by: FAMILY MEDICINE

## 2025-03-25 PROCEDURE — 1123F ACP DISCUSS/DSCN MKR DOCD: CPT | Performed by: FAMILY MEDICINE

## 2025-03-25 PROCEDURE — 3078F DIAST BP <80 MM HG: CPT | Performed by: FAMILY MEDICINE

## 2025-03-25 PROCEDURE — 1159F MED LIST DOCD IN RCRD: CPT | Performed by: FAMILY MEDICINE

## 2025-03-25 SDOH — ECONOMIC STABILITY: FOOD INSECURITY: WITHIN THE PAST 12 MONTHS, THE FOOD YOU BOUGHT JUST DIDN'T LAST AND YOU DIDN'T HAVE MONEY TO GET MORE.: NEVER TRUE

## 2025-03-25 SDOH — ECONOMIC STABILITY: FOOD INSECURITY: WITHIN THE PAST 12 MONTHS, YOU WORRIED THAT YOUR FOOD WOULD RUN OUT BEFORE YOU GOT MONEY TO BUY MORE.: NEVER TRUE

## 2025-03-25 ASSESSMENT — LIFESTYLE VARIABLES
HOW OFTEN DO YOU HAVE A DRINK CONTAINING ALCOHOL: MONTHLY OR LESS
HOW MANY STANDARD DRINKS CONTAINING ALCOHOL DO YOU HAVE ON A TYPICAL DAY: 1 OR 2

## 2025-03-25 ASSESSMENT — PATIENT HEALTH QUESTIONNAIRE - PHQ9
SUM OF ALL RESPONSES TO PHQ QUESTIONS 1-9: 0
2. FEELING DOWN, DEPRESSED OR HOPELESS: NOT AT ALL
SUM OF ALL RESPONSES TO PHQ QUESTIONS 1-9: 0
1. LITTLE INTEREST OR PLEASURE IN DOING THINGS: NOT AT ALL
SUM OF ALL RESPONSES TO PHQ QUESTIONS 1-9: 0
SUM OF ALL RESPONSES TO PHQ QUESTIONS 1-9: 0

## 2025-03-25 NOTE — PROGRESS NOTES
Medicare Annual Wellness Visit    Ulises Chu is here for Medicare AWV    Assessment & Plan   Assessment and Plan:   Diagnosis Orders   1. Medicare annual wellness visit, subsequent               No problem-specific Assessment & Plan notes found for this encounter.       Plan as above.  Counseled extensively and differential diagnoses relevant to above were reviewed, including serious etiologies, risks and complications, especially if left uncontrolled.  If relevant, instructions and  alternatives to meds/treatment reviewed, as well as interactions, and  SE's/ADRs reviewed, notify immediately if any, discontinuing new meds if any.  Plan made after discussion and shared decision making.        Health maintenance issues discussed at length as above, 3/25/2025 .  Encouraged yearly physicals.          As long as symptoms steadily improve/resolve, and medical conditions follow the expected course, FU as below, as previously directed and sooner PRN.    Return for Medicare Annual Wellness Visit in 1 year.                Educational materials and/or home exercises printed for patient's review and were included in patient instructions on his/her After Visit Summary and given to patient at the end of visit.       After discussion, patient and/or guardian verbalizes understanding, agrees, feels comfortable with and wishes to proceed with above treatment plan. Call for any pending results, FU sooner if abnormal, as needed or if any current symptoms persist/worsen.      Advised patient to call with any new medication issues, and read all Rx info from pharmacy to assure aware of all possible risks and side effects of medication before taking.     Reviewed age and gender appropriate health screening exams and vaccinations.  Advised patient regarding importance of keeping up with recommended health maintenance and to schedule as soon as possible if overdue, as this is important in assessing for undiagnosed pathology, especially

## 2025-06-09 ENCOUNTER — OFFICE VISIT (OUTPATIENT)
Dept: FAMILY MEDICINE CLINIC | Age: 75
End: 2025-06-09
Payer: MEDICARE

## 2025-06-09 ENCOUNTER — TELEPHONE (OUTPATIENT)
Dept: PRIMARY CARE CLINIC | Age: 75
End: 2025-06-09

## 2025-06-09 VITALS
TEMPERATURE: 98 F | RESPIRATION RATE: 18 BRPM | OXYGEN SATURATION: 95 % | HEIGHT: 70 IN | BODY MASS INDEX: 32.64 KG/M2 | DIASTOLIC BLOOD PRESSURE: 72 MMHG | SYSTOLIC BLOOD PRESSURE: 130 MMHG | HEART RATE: 92 BPM | WEIGHT: 228 LBS

## 2025-06-09 DIAGNOSIS — J01.90 ACUTE NON-RECURRENT SINUSITIS, UNSPECIFIED LOCATION: Primary | ICD-10-CM

## 2025-06-09 DIAGNOSIS — H91.93 DECREASED HEARING OF BOTH EARS: Primary | ICD-10-CM

## 2025-06-09 PROCEDURE — 1123F ACP DISCUSS/DSCN MKR DOCD: CPT | Performed by: PHYSICIAN ASSISTANT

## 2025-06-09 PROCEDURE — 1159F MED LIST DOCD IN RCRD: CPT | Performed by: PHYSICIAN ASSISTANT

## 2025-06-09 PROCEDURE — 3078F DIAST BP <80 MM HG: CPT | Performed by: PHYSICIAN ASSISTANT

## 2025-06-09 PROCEDURE — 3075F SYST BP GE 130 - 139MM HG: CPT | Performed by: PHYSICIAN ASSISTANT

## 2025-06-09 PROCEDURE — 1160F RVW MEDS BY RX/DR IN RCRD: CPT | Performed by: PHYSICIAN ASSISTANT

## 2025-06-09 PROCEDURE — 99213 OFFICE O/P EST LOW 20 MIN: CPT | Performed by: PHYSICIAN ASSISTANT

## 2025-06-09 RX ORDER — CEFDINIR 300 MG/1
300 CAPSULE ORAL 2 TIMES DAILY
Qty: 20 CAPSULE | Refills: 0 | Status: SHIPPED | OUTPATIENT
Start: 2025-06-09 | End: 2025-06-19

## 2025-06-09 NOTE — TELEPHONE ENCOUNTER
Okay for referral to [facility] unless he has a preference.  Diagnosis decreased hearing

## 2025-06-09 NOTE — PROGRESS NOTES
they would be informed prior to start of the visit. The patient (or guardian, if applicable) and other individuals in attendance at the appointment consented to the use of AI if was utilized, including the recording.  Every effort was made to ensure accuracy; however, inadvertent computerized transcription errors may be present.

## 2025-07-28 ENCOUNTER — OFFICE VISIT (OUTPATIENT)
Dept: ENT CLINIC | Age: 75
End: 2025-07-28
Payer: MEDICARE

## 2025-07-28 ENCOUNTER — PROCEDURE VISIT (OUTPATIENT)
Dept: AUDIOLOGY | Age: 75
End: 2025-07-28
Payer: MEDICARE

## 2025-07-28 VITALS
TEMPERATURE: 97.4 F | WEIGHT: 225.8 LBS | SYSTOLIC BLOOD PRESSURE: 150 MMHG | HEIGHT: 69 IN | DIASTOLIC BLOOD PRESSURE: 96 MMHG | BODY MASS INDEX: 33.44 KG/M2 | OXYGEN SATURATION: 97 % | HEART RATE: 74 BPM

## 2025-07-28 DIAGNOSIS — T16.2XXA ACUTE FOREIGN BODY OF LEFT EAR CANAL, INITIAL ENCOUNTER: Primary | ICD-10-CM

## 2025-07-28 DIAGNOSIS — H91.90 DECREASED HEARING, UNSPECIFIED LATERALITY: Primary | ICD-10-CM

## 2025-07-28 DIAGNOSIS — H60.332 ACUTE SWIMMER'S EAR OF LEFT SIDE: ICD-10-CM

## 2025-07-28 PROCEDURE — 3078F DIAST BP <80 MM HG: CPT

## 2025-07-28 PROCEDURE — 99204 OFFICE O/P NEW MOD 45 MIN: CPT

## 2025-07-28 PROCEDURE — 92567 TYMPANOMETRY: CPT | Performed by: AUDIOLOGIST

## 2025-07-28 PROCEDURE — 1159F MED LIST DOCD IN RCRD: CPT

## 2025-07-28 PROCEDURE — 1160F RVW MEDS BY RX/DR IN RCRD: CPT

## 2025-07-28 PROCEDURE — 69200 CLEAR OUTER EAR CANAL: CPT

## 2025-07-28 PROCEDURE — 3077F SYST BP >= 140 MM HG: CPT

## 2025-07-28 PROCEDURE — 1123F ACP DISCUSS/DSCN MKR DOCD: CPT

## 2025-07-28 RX ORDER — CIPROFLOXACIN AND DEXAMETHASONE 3; 1 MG/ML; MG/ML
4 SUSPENSION/ DROPS AURICULAR (OTIC) 2 TIMES DAILY
Qty: 7.5 ML | Refills: 0 | Status: SHIPPED | OUTPATIENT
Start: 2025-07-28 | End: 2025-08-04

## 2025-07-28 RX ORDER — FLUTICASONE PROPIONATE 50 MCG
2 SPRAY, SUSPENSION (ML) NASAL DAILY PRN
COMMUNITY

## 2025-07-28 ASSESSMENT — ENCOUNTER SYMPTOMS
RHINORRHEA: 1
EYE DISCHARGE: 0
DIARRHEA: 0
EYE PAIN: 0
ALLERGIC/IMMUNOLOGIC NEGATIVE: 1
VOMITING: 0
COUGH: 0
SHORTNESS OF BREATH: 0
SORE THROAT: 0
SINUS PRESSURE: 0
BACK PAIN: 0

## 2025-07-28 NOTE — PROGRESS NOTES
This patient was referred for audiometric/tympanometric testing by CATIE Widle due to ear infection and decrease hearing.        Tympanometry revealed normal middle ear peak pressure and compliance, in the right ear. DNT left ear - active wet ear canal.          The results were reviewed with the patient and CNP.     Recommendations for follow up will be made pending ordering provider consult.    Britany Hernandez/CCC-A  [place] Lic # [id number]

## 2025-07-28 NOTE — PROGRESS NOTES
A foreign body (Hearing aid dome) is present.      Nose: Nasal deformity, septal deviation, congestion and rhinorrhea present.      Right Turbinates: Swollen and pale.      Left Turbinates: Swollen and pale.      Mouth/Throat:      Lips: Pink.      Mouth: Mucous membranes are moist.      Pharynx: Oropharynx is clear.     Eyes:      General: Lids are normal.      Conjunctiva/sclera: Conjunctivae normal.      Pupils: Pupils are equal, round, and reactive to light.   Cardiovascular:      Rate and Rhythm: Normal rate and regular rhythm.      Pulses: Normal pulses.   Pulmonary:      Effort: Pulmonary effort is normal. No respiratory distress.      Breath sounds: No stridor.   Abdominal:      General: Abdomen is flat.      Palpations: Abdomen is soft.   Musculoskeletal:         General: Normal range of motion.      Cervical back: Normal range of motion. No rigidity.   Skin:     General: Skin is warm and dry.   Neurological:      General: No focal deficit present.      Mental Status: He is alert and oriented to person, place, and time.   Psychiatric:         Attention and Perception: Attention normal.         Mood and Affect: Affect normal.         Behavior: Behavior normal. Behavior is cooperative.         Thought Content: Thought content normal.         Judgment: Judgment normal.       Tympanogram - \     Right - Type A    Pressure - normal     Left   - CNT    Pressure - CNT    FB Removal  Procedure    With patient seated, a speculum was placed in the left ear and viewed under a microscope.  Hearing aid dome  was present in the canal and removed using alligator forceps. There was some bleeding noted upon removal. Bleed did stop prior to patient leaving the office. Did note some improvement in hearing after removal.       Assessment:       Diagnosis Orders   1. Acute foreign body of left ear canal, initial encounter  Tympanometry      2. Acute swimmer's ear of left side              Plan:      Anthony is a 75-year-old male

## 2025-08-08 ENCOUNTER — OFFICE VISIT (OUTPATIENT)
Dept: ENT CLINIC | Age: 75
End: 2025-08-08
Payer: MEDICARE

## 2025-08-08 VITALS
HEIGHT: 69 IN | OXYGEN SATURATION: 93 % | BODY MASS INDEX: 34.16 KG/M2 | TEMPERATURE: 97.3 F | HEART RATE: 75 BPM | SYSTOLIC BLOOD PRESSURE: 153 MMHG | DIASTOLIC BLOOD PRESSURE: 74 MMHG | WEIGHT: 230.6 LBS

## 2025-08-08 DIAGNOSIS — H60.332 ACUTE SWIMMER'S EAR OF LEFT SIDE: ICD-10-CM

## 2025-08-08 DIAGNOSIS — R09.81 CHRONIC NASAL CONGESTION: ICD-10-CM

## 2025-08-08 DIAGNOSIS — J30.9 CHRONIC ALLERGIC RHINITIS: ICD-10-CM

## 2025-08-08 DIAGNOSIS — T16.2XXA ACUTE FOREIGN BODY OF LEFT EAR CANAL, INITIAL ENCOUNTER: Primary | ICD-10-CM

## 2025-08-08 PROCEDURE — 1123F ACP DISCUSS/DSCN MKR DOCD: CPT

## 2025-08-08 PROCEDURE — 1160F RVW MEDS BY RX/DR IN RCRD: CPT

## 2025-08-08 PROCEDURE — 3078F DIAST BP <80 MM HG: CPT

## 2025-08-08 PROCEDURE — 3077F SYST BP >= 140 MM HG: CPT

## 2025-08-08 PROCEDURE — 1159F MED LIST DOCD IN RCRD: CPT

## 2025-08-08 PROCEDURE — 99213 OFFICE O/P EST LOW 20 MIN: CPT

## 2025-08-08 RX ORDER — AZELASTINE 1 MG/ML
1-2 SPRAY, METERED NASAL 2 TIMES DAILY PRN
Qty: 90 ML | Refills: 3 | Status: SHIPPED | OUTPATIENT
Start: 2025-08-08

## 2025-08-19 DIAGNOSIS — I10 HYPERTENSION, ESSENTIAL: ICD-10-CM

## 2025-08-19 RX ORDER — LISINOPRIL 20 MG/1
20 TABLET ORAL DAILY
Qty: 90 TABLET | Refills: 1 | Status: SHIPPED | OUTPATIENT
Start: 2025-08-19